# Patient Record
Sex: FEMALE | Race: WHITE | Employment: FULL TIME | URBAN - METROPOLITAN AREA
[De-identification: names, ages, dates, MRNs, and addresses within clinical notes are randomized per-mention and may not be internally consistent; named-entity substitution may affect disease eponyms.]

---

## 2018-05-04 PROBLEM — J30.2 SEASONAL ALLERGIC RHINITIS: Status: ACTIVE | Noted: 2018-05-04

## 2018-05-10 ENCOUNTER — HOSPITAL ENCOUNTER (OUTPATIENT)
Dept: MAMMOGRAPHY | Age: 33
Discharge: HOME | End: 2018-05-10
Attending: FAMILY MEDICINE
Payer: COMMERCIAL

## 2018-05-10 DIAGNOSIS — N63.0 BREAST MASS: ICD-10-CM

## 2018-05-10 PROCEDURE — 76642 ULTRASOUND BREAST LIMITED: CPT

## 2018-05-10 PROCEDURE — 77066 DX MAMMO INCL CAD BI: CPT

## 2018-11-20 ENCOUNTER — HOSPITAL ENCOUNTER (OUTPATIENT)
Dept: PHYSICAL THERAPY | Age: 33
Discharge: HOME | End: 2018-11-20
Attending: FAMILY MEDICINE
Payer: COMMERCIAL

## 2018-11-20 DIAGNOSIS — M25.521 RIGHT ELBOW PAIN: ICD-10-CM

## 2018-11-20 PROCEDURE — 97140 MANUAL THERAPY 1/> REGIONS: CPT

## 2018-11-20 PROCEDURE — 97161 PT EVAL LOW COMPLEX 20 MIN: CPT

## 2018-11-20 NOTE — THERAPY EVALUATION
Raquel Bhardwaj  : 1985  Primary: [address]  Secondary:  [address] [place]  at [facility]  [address], [address], [place] [address] [address] [facility]  [phone]   [phone]       OUTPATIENT PHYSICAL THERAPY:Initial Assessment 2018    ICD-10: Treatment Diagnosis: Pain in right elbow (M25.521)    Pain in right arm (M79.601)  Precautions/Allergies:   Patient has no allergy information on record. MD Orders: Evaluate and treat MEDICAL/REFERRING DIAGNOSIS:  Right elbow pain [M25.521]   DATE OF ONSET: beginning of 2018  REFERRING PHYSICIAN: Simon Hargrove DO  RETURN PHYSICIAN APPOINTMENT: unspecified   This section established at most recent assessment  ASSESSMENT:  Raquel Bhardwaj is a 29 y.o. female who presents to physical therapy for sudden insidious onset of R elbow and anterior arm pain beginning several weeks prior to initial evaluation. This session, she demonstrated decreased B UE strength/endurance (R UE weaker), decreased R elbow mobility with associated pain, and decreased functional mobility as evident by a score of 26/55 on the Disabilities of the Arm, Shoulder, and Hand Questionnaire (with higher scores indicating increased disability). 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 Of note, pt's pain appears to be originating from significant tightness in her R biceps and wrist flexors (may be due to combination of stress and prolonged positioning of R elbow in flexed position). Pt may benefit from skilled PT to address the above listed deficits to improve ability to perform pain-free ADLs/IADLs and to improve overall quality of life prior to discharge. PROBLEM LIST (Impacting functional limitations):  1. Decreased Strength  2. Decreased ADL/Functional Activities  3. Increased Pain  4. Decreased Activity Tolerance  5.  Decreased Work Simplification/Energy Conservation Techniques  6. Decreased Flexibility/Joint Mobility  7. Edema/Girth INTERVENTIONS PLANNED:  1. Bed Mobility  2. Cold  3. Electrical Stimulation  4. Heat  5. Home Exercise Program (HEP)  6. Manual Therapy  7. Neuromuscular Re-education/Strengthening  8. Range of Motion (ROM)  9. Therapeutic Activites  10. Therapeutic Exercise/Strengthening  11. Transcutaneous Electrical Nerve Stimulation (TENS)  12. Ultrasound   TREATMENT PLAN:  Effective Dates/Frequency/Duration: Once per week from 11/20/2018 till 1/4/2019 (45 Days). .  GOALS: (Goals have been discussed and agreed upon with patient.)  Discharge Goals: Time Frame: 45 days  1. Pt will be compliant and independent with HEP in order to increase UE strength/endurance/mobility to improve functional mobility and overall quality of life. 2. Pt will improve score on the Disabilities of the Arm, Shoulder, and Hand (DASH) Questionnaire to 11/55 in order to improve independence with ADLs and IADLs and to improve overall quality of life. 3. Pt will increase R elbow extension PROM to 0 degrees with minimal to no increase in pain in order to improve functional mobility and ability to reach further distance. 4. Pt will be able to open a heavy door with minimal to no increase in pain in order to be able to demonstrate improved mobility. Rehabilitation Potential For Stated Goals: Good  Regarding Jordi garvin, I certify that the treatment plan above will be carried out by a therapist or under their direction. Thank you for this referral,  Daniel Kramer DPT     Referring Physician Signature: Jacquelin Blanco DO              Date                    HISTORY:   History of Present Injury/Illness (Reason for Referral): *History per pt or pt's family except where otherwise noted  Pt states a couple of weeks ago in the evening her R arm (along anterior arm) started to become very painful and she couldn't straighten it all the way.  States the worst amount of pain was in the evening and at night when trying to sleep (couldn't get it in a comfortable position), but now the most pain is when she is doing aggravating activities (states she feels sharp pain that radiates along anterior arm). Pain at worst is 9/10 (aggravating activities include trying to reach behind her back to wipe after toileting, reaching for something far away by her side, supinating her arm, working at the computer with moving mouse/typing, and opening a heavy door). Pain goes away almost immediately after aggravating activity. Pt states the pain has occasionally woken up during the middle of the night due to pain, but this has only happened a couple of times. Pain at best is 0/10 (eases pain with putting arm in resting cradled position, ibuprofen). Past Medical History/Comorbidities:   Ms. Daria Josue  has no past medical history on file. Ms. Daria Josue  has a past surgical history that includes hx other surgical.  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 Fall Risk:  Ambulatory/Rehab Services H2 Model Falls Risk Assessment   Risk Factors:       No Risk Factors Identified Ability to Rise from Chair:       (0)  Ability to rise in a single movement   Falls Prevention Plan:       No modifications necessary   Total: (5 or greater = High Risk): 0   ©2010 [facility] of [employer]. All Rights Reserved. [facility] Patent #6,[id number].  Federal Law prohibits the replication, distribution or use without written permission from [employer]     Current Medications:    Current Outpatient Medications:     norelgestromin-ethinyl estradiol (ORTHO EVRA) 150-35 mcg/24 hr, 1 Patch by TransDERmal route Every Saturday. , Disp: 3 Patch, Rfl: 13    loratadine (CLARITIN) 10 mg tablet, Take 10 mg by mouth., Disp: , Rfl:    Date Last Reviewed:  11/20/2018   # of Personal Factors/Comorbidities that affect the Plan of Care: 0: LOW COMPLEXITY   EXAMINATION:   Initial assessment on 11/20/2018  Observation/Orthostatic Postural Assessment:    The following postural deficits were noted in sitting: forward head, rounded shoulders and posterior pelvic tilt   The following postural deficits were noted in standing: none of the above   Palpation:         Palpable tightness along R biceps brachii (short head and long head), brachialis, brachioradialis, pronator teres, wrist flexors (flexor carpi radialis, palmaris longus, flexor carpi ulnaris, flexor digitorum)   ROM:    Initial measurement: (on 11/20/2018) Initial measurement: (on 11/20/2018) Reassessment measurement:  Reassessment measurement:    L UE:  Elbow extension: slightly past 0 degrees (pt hyperextends slightly)  Elbow flexion: WFL R UE:  Elbow extension: 17 degrees flexion passively with pain  Elbow flexion: 147 degrees passively with pain in biceps       Initial measurement: (on 11/20/2018) Reassessment measurement: Reassessment measurement:    Cervical AROM 100%, slightly tight on R side per pt report    Thoracic AROM WFL            Strength:    Initial measurement: (on 11/20/2018) Initial measurement: (on 11/20/2018) Reassessment measurement:  Reassessment measurement:    L UE:  Shoulder flexion: 4/5  Shoulder extension: 5/5  Shoulder abduction: 4/5  Shoulder adduction: 5/5  Shoulder IR: 5/5  Shoulder ER: 4+/5  Elbow flexion: 5/5  Elbow extension: 5/5  Wrist flexion/extension: 5/5 R UE:  Shoulder flexion: 4-/5 (pain in bicep musculature)  Shoulder extension: 5/5  Shoulder abduction: 4/5 (sore in anterior elbow and radiating out)  Shoulder adduction: 5/5  Shoulder IR: 5/5  Shoulder ER: 4+/5  Elbow flexion: 4-/5 (pain in wrist flexors and bicep)  Elbow extension: 4/5  Wrist flexion/extension: 5/5       Special Tests:          Wrist extensor stretch: + for pain  Neurological Screen:        Myotomes:  WFL        Dermatomes:  No deficits noted     Body Structures Involved:  1. Nerves  2. Bones  3. Joints  4. Muscles  5. Ligaments Body Functions Affected:  1. Sensory/Pain  2. Neuromusculoskeletal  3. Movement Related Activities and Participation Affected:  1. General Tasks and Demands  2. Mobility  3. Self Care  4. Domestic Life  5. Interpersonal Interactions and Relationships  6. Community, Social and [place]   # of elements that affect the Plan of Care: 4+: HIGH COMPLEXITY   CLINICAL PRESENTATION:   Presentation: Evolving clinical presentation with changing clinical characteristics: MODERATE COMPLEXITY   CLINICAL DECISION MAKING:   Outcome Measure: Tool Used: Disabilities of the Arm, Shoulder and Hand (DASH) Questionnaire - Quick Version  Score:  Initial: 26/55  Most Recent: X/55 (Date: -- )   Interpretation of Score: The DASH is designed to measure the activities of daily living in person's with upper extremity dysfunction or pain. Each section is scored on a 1-5 scale, 5 representing the greatest disability. The scores of each section are added together for a total score of 55. Score 11 12-19 20-28 29-37 38-45 46-54 55   Modifier CH CI CJ CK CL CM CN     Payor: Minnie Terrell / Plan: Aggie Hameed / Product Type: PPO /   Medical Necessity:   · Patient is expected to demonstrate progress in strength, range of motion and functional technique to improve ability to perform pain-free ADLs/IADLs adn to improve overall quality of life. · Patient demonstrates good rehab potential due to higher previous functional level.   Reason for Services/Other Comments:  · Patient continues to require modification of therapeutic interventions to increase complexity of exercises. Use of outcome tool(s) and clinical judgement create a POC that gives a: Questionable prediction of patient's progress: MODERATE COMPLEXITY   TREATMENT:   (In addition to Assessment/Re-Assessment sessions the following treatments were rendered)  Subjective comments at beginning of session: See history above  MANUAL THERAPY: (24 minutes): Joint mobilization and Soft tissue mobilization was utilized and necessary because of the patient's restricted joint motion, painful spasm, loss of articular motion and restricted motion of soft tissue. With pt in supine position, STM and trigger point release along R biceps brachii (short head and long head), brachialis, brachioradialis, pronator teres, wrist flexors (flexor carpi radialis, palmaris longus, flexor carpi ulnaris, flexor digitorum) to reduce muscular tightness and pain and improve elbow extension. Treatment/Session Assessment:  See assessment above. Discussed and demonstrated to pt how to perform gradual stretching of arm with support on progressively less pillows, doorway stretch, wrist flexor stretch. · Pain/ Symptoms: Initial:   No pain reported at beginning of session with pt in rest position Post Session:  No pain at end of session ·   Compliance with Program/Exercises: Will assess as treatment progresses. · Recommendations/Intent for next treatment session: \"Next visit will focus on advancements to more challenging activities and reduction in assistance provided\".  Manual therapy/modalities as needed to reduce pain; gentle strengthening and stretching exercises if needed; work on ergonomics for pt's work  Total Treatment Duration:  PT Patient Time In/Time Out  Time In: 1300  Time Out: DIMITRI Cotter    Future Appointments   Date Time Provider Moises Carolann   11/27/2018  8:00 AM Jethro Heaton DPT [facility]   11/14/2019  9:15 AM Gal Yee MD [address]

## 2018-11-27 ENCOUNTER — HOSPITAL ENCOUNTER (OUTPATIENT)
Dept: PHYSICAL THERAPY | Age: 33
Discharge: HOME | End: 2018-11-27
Payer: COMMERCIAL

## 2018-11-27 PROCEDURE — 97140 MANUAL THERAPY 1/> REGIONS: CPT

## 2018-11-27 NOTE — PROGRESS NOTES
Ophelia Pardo  : 1985  Primary: [address]  Secondary:  [address]  at [facility] [facility] [place], [facility] [address] [facility]  [phone]   [phone]       OUTPATIENT PHYSICAL THERAPY:Daily Note 2018    ICD-10: Treatment Diagnosis: Pain in right elbow (M25.521)    Pain in right arm (M79.601)  Precautions/Allergies:   Patient has no allergy information on record. MD Orders: Evaluate and treat MEDICAL/REFERRING DIAGNOSIS:  Right elbow pain [M25.521]   DATE OF ONSET: beginning of 2018  REFERRING PHYSICIAN: DO ORION Estrada PHYSICIAN APPOINTMENT: unspecified   This section established at most recent assessment  ASSESSMENT:  Ophelia Pardo is a 29 y.o. female who presents to physical therapy for sudden insidious onset of R elbow and anterior arm pain beginning several weeks prior to initial evaluation. This session, she demonstrated decreased B UE strength/endurance (R UE weaker), decreased R elbow mobility with associated pain, and decreased functional mobility as evident by a score of 26/55 on the Disabilities of the Arm, Shoulder, and Hand Questionnaire (with higher scores indicating increased disability). 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 Of note, pt's pain appears to be originating from significant tightness in her R biceps and wrist flexors (may be due to combination of stress and prolonged positioning of R elbow in flexed position). Pt may benefit from skilled PT to address the above listed deficits to improve ability to perform pain-free ADLs/IADLs and to improve overall quality of life prior to discharge. PROBLEM LIST (Impacting functional limitations):  1. Decreased Strength  2. Decreased ADL/Functional Activities  3. Increased Pain  4. Decreased Activity Tolerance  5.  Decreased Work Simplification/Energy Conservation Techniques  6. Decreased Flexibility/Joint Mobility  7. Edema/Girth INTERVENTIONS PLANNED:  1. Bed Mobility  2. Cold  3. Electrical Stimulation  4. Heat  5. Home Exercise Program (HEP)  6. Manual Therapy  7. Neuromuscular Re-education/Strengthening  8. Range of Motion (ROM)  9. Therapeutic Activites  10. Therapeutic Exercise/Strengthening  11. Transcutaneous Electrical Nerve Stimulation (TENS)  12. Ultrasound   TREATMENT PLAN:  Effective Dates/Frequency/Duration: Once per week from 11/20/2018 till 1/4/2019 (45 Days). .  GOALS: (Goals have been discussed and agreed upon with patient.)  Discharge Goals: Time Frame: 45 days  1. Pt will be compliant and independent with HEP in order to increase UE strength/endurance/mobility to improve functional mobility and overall quality of life. 2. Pt will improve score on the Disabilities of the Arm, Shoulder, and Hand (DASH) Questionnaire to 11/55 in order to improve independence with ADLs and IADLs and to improve overall quality of life. 3. Pt will increase R elbow extension PROM to 0 degrees with minimal to no increase in pain in order to improve functional mobility and ability to reach further distance. 4. Pt will be able to open a heavy door with minimal to no increase in pain in order to be able to demonstrate improved mobility. Rehabilitation Potential For Stated Goals: Good  Regarding Steph Salon therapy, I certify that the treatment plan above will be carried out by a therapist or under their direction. Thank you for this referral,  Angel Trejo DPT     Referring Physician Signature: Debra Morrell DO              Date                    HISTORY:   History of Present Injury/Illness (Reason for Referral): *History per pt or pt's family except where otherwise noted  Pt states a couple of weeks ago in the evening her R arm (along anterior arm) started to become very painful and she couldn't straighten it all the way.  States the worst amount of pain was in the evening and at night when trying to sleep (couldn't get it in a comfortable position), but now the most pain is when she is doing aggravating activities (states she feels sharp pain that radiates along anterior arm). Pain at worst is 9/10 (aggravating activities include trying to reach behind her back to wipe after toileting, reaching for something far away by her side, supinating her arm, working at the computer with moving mouse/typing, and opening a heavy door). Pain goes away almost immediately after aggravating activity. Pt states the pain has occasionally woken up during the middle of the night due to pain, but this has only happened a couple of times. Pain at best is 0/10 (eases pain with putting arm in resting cradled position, ibuprofen). Past Medical History/Comorbidities:   Ms. Valeria Quinones  has no past medical history on file. Ms. Valeria Quinones  has a past surgical history that includes hx other surgical.  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 Fall Risk:  Ambulatory/Rehab Services H2 Model Falls Risk Assessment   Risk Factors:       No Risk Factors Identified Ability to Rise from Chair:       (0)  Ability to rise in a single movement   Falls Prevention Plan:       No modifications necessary   Total: (5 or greater = High Risk): 0   ©2010 [facility] [employer]. All Rights Reserved. [facility] Patent #[id number].  Federal Law prohibits the replication, distribution or use without written permission from [facility] [employer]     Current Medications:    Current Outpatient Medications:     norelgestromin-ethinyl estradiol (ORTHO EVRA) 150-35 mcg/24 hr, 1 Patch by TransDERmal route Every Saturday. , Disp: 3 Patch, Rfl: 13    loratadine (CLARITIN) 10 mg tablet, Take 10 mg by mouth., Disp: , Rfl:    Date Last Reviewed:  11/27/2018   # of Personal Factors/Comorbidities that affect the Plan of Care: 0: LOW COMPLEXITY   EXAMINATION:   Initial assessment on 11/20/2018  Observation/Orthostatic Postural Assessment:    The following postural deficits were noted in sitting: forward head, rounded shoulders and posterior pelvic tilt   The following postural deficits were noted in standing: none of the above   Palpation:         Palpable tightness along R biceps brachii (short head and long head), brachialis, brachioradialis, pronator teres, wrist flexors (flexor carpi radialis, palmaris longus, flexor carpi ulnaris, flexor digitorum)   ROM:    Initial measurement: (on 11/20/2018) Initial measurement: (on 11/20/2018) Reassessment measurement:  Reassessment measurement:    L UE:  Elbow extension: slightly past 0 degrees (pt hyperextends slightly)  Elbow flexion: WFL R UE:  Elbow extension: 17 degrees flexion passively with pain  Elbow flexion: 147 degrees passively with pain in biceps       Initial measurement: (on 11/20/2018) Reassessment measurement: Reassessment measurement:    Cervical AROM 100%, slightly tight on R side per pt report    Thoracic AROM WFL            Strength:    Initial measurement: (on 11/20/2018) Initial measurement: (on 11/20/2018) Reassessment measurement:  Reassessment measurement:    L UE:  Shoulder flexion: 4/5  Shoulder extension: 5/5  Shoulder abduction: 4/5  Shoulder adduction: 5/5  Shoulder IR: 5/5  Shoulder ER: 4+/5  Elbow flexion: 5/5  Elbow extension: 5/5  Wrist flexion/extension: 5/5 R UE:  Shoulder flexion: 4-/5 (pain in bicep musculature)  Shoulder extension: 5/5  Shoulder abduction: 4/5 (sore in anterior elbow and radiating out)  Shoulder adduction: 5/5  Shoulder IR: 5/5  Shoulder ER: 4+/5  Elbow flexion: 4-/5 (pain in wrist flexors and bicep)  Elbow extension: 4/5  Wrist flexion/extension: 5/5       Special Tests:          Wrist extensor stretch: + for pain  Neurological Screen:        Myotomes:  WFL        Dermatomes:  No deficits noted     Body Structures Involved:  1. Nerves  2. Bones  3. Joints  4. Muscles  5. Ligaments Body Functions Affected:  1. Sensory/Pain  2. Neuromusculoskeletal  3. Movement Related Activities and Participation Affected:  1. General Tasks and Demands  2. Mobility  3. Self Care  4. Domestic Life  5. Interpersonal Interactions and Relationships  6. Community, Social and [place]   # of elements that affect the Plan of Care: 4+: HIGH COMPLEXITY   CLINICAL PRESENTATION:   Presentation: Evolving clinical presentation with changing clinical characteristics: MODERATE COMPLEXITY   CLINICAL DECISION MAKING:   Outcome Measure: Tool Used: Disabilities of the Arm, Shoulder and Hand (DASH) Questionnaire - Quick Version  Score:  Initial: 26/55  Most Recent: X/55 (Date: -- )   Interpretation of Score: The DASH is designed to measure the activities of daily living in person's with upper extremity dysfunction or pain. Each section is scored on a 1-5 scale, 5 representing the greatest disability. The scores of each section are added together for a total score of 55. Score 11 12-19 20-28 29-37 38-45 46-54 55   Modifier CH CI CJ CK CL CM CN     Payor: James Click / Plan: Christa Free / Product Type: PPO /   Medical Necessity:   · Patient is expected to demonstrate progress in strength, range of motion and functional technique to improve ability to perform pain-free ADLs/IADLs adn to improve overall quality of life. · Patient demonstrates good rehab potential due to higher previous functional level.   Reason for Services/Other Comments:  · Patient continues to require modification of therapeutic interventions to increase complexity of exercises. Use of outcome tool(s) and clinical judgement create a POC that gives a: Questionable prediction of patient's progress: MODERATE COMPLEXITY   TREATMENT:   (In addition to Assessment/Re-Assessment sessions the following treatments were rendered)  Subjective comments at beginning of session: Pt reporting that her elbow is able to straighten a lot more easily today and this past week (she has been stretching every day), and she is able to do a lot more without her arm hurting, although she still has some short bouts of sharp pain occasionally when moving her arm faster  MANUAL THERAPY: (40 minutes): Joint mobilization and Soft tissue mobilization was utilized and necessary because of the patient's restricted joint motion, painful spasm, loss of articular motion and restricted motion of soft tissue. With pt in supine position, STM and trigger point release along R biceps brachii (short head and long head), brachialis, brachioradialis, pronator teres, wrist flexors (flexor carpi radialis, palmaris longus, flexor carpi ulnaris, flexor digitorum) to reduce muscular tightness and pain and improve elbow extension. Treatment/Session Assessment:  Pt demonstrating significantly improved R elbow extension at beginning of session, and this improved further with manual therapy, but she continues to have slight limitation at end range. · Pain/ Symptoms: Initial:   No pain reported at beginning of session  Post Session:  No pain at end of session ·   Compliance with Program/Exercises: Will assess as treatment progresses. · Recommendations/Intent for next treatment session: \"Next visit will focus on advancements to more challenging activities and reduction in assistance provided\".  Manual therapy/modalities as needed to reduce pain; gentle strengthening and stretching exercises if needed; work on ergonomics for pt's work  Total Treatment Duration:  PT Patient Time In/Time Out  Time In: 0800  Time Out: 14[address] [place] Loni Robledo DPT    Future Appointments   Date Time Provider Moises Vuong   11/14/2019  9:15 AM Viral Yee MD [facility] [address] [place] [address]

## 2018-12-06 ENCOUNTER — HOSPITAL ENCOUNTER (OUTPATIENT)
Dept: PHYSICAL THERAPY | Age: 33
Discharge: HOME | End: 2018-12-06
Payer: COMMERCIAL

## 2018-12-06 PROCEDURE — 97014 ELECTRIC STIMULATION THERAPY: CPT

## 2018-12-06 PROCEDURE — 97140 MANUAL THERAPY 1/> REGIONS: CPT

## 2018-12-06 NOTE — PROGRESS NOTES
Norman Crespo  : 1985  Primary: [address]  Secondary:  [address] [place]  at [facility]  [facility], [address] [place] [address], [place], [facility]  [phone]   [phone]       OUTPATIENT PHYSICAL THERAPY:Daily Note 2018    ICD-10: Treatment Diagnosis: Pain in right elbow (M25.521)    Pain in right arm (M79.601)  Precautions/Allergies:   Patient has no allergy information on record. MD Orders: Evaluate and treat MEDICAL/REFERRING DIAGNOSIS:  Right elbow pain [M25.521]   DATE OF ONSET: beginning of 2018  REFERRING PHYSICIAN: Cheryle Jain, DO RETURN PHYSICIAN APPOINTMENT: unspecified   This section established at most recent assessment  ASSESSMENT:  Norman Crespo is a 29 y.o. female who presents to physical therapy for sudden insidious onset of R elbow and anterior arm pain beginning several weeks prior to initial evaluation. This session, she demonstrated decreased B UE strength/endurance (R UE weaker), decreased R elbow mobility with associated pain, and decreased functional mobility as evident by a score of 26/55 on the Disabilities of the Arm, Shoulder, and Hand Questionnaire (with higher scores indicating increased disability). 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 Of note, pt's pain appears to be originating from significant tightness in her R biceps and wrist flexors (may be due to combination of stress and prolonged positioning of R elbow in flexed position). Pt may benefit from skilled PT to address the above listed deficits to improve ability to perform pain-free ADLs/IADLs and to improve overall quality of life prior to discharge. PROBLEM LIST (Impacting functional limitations):  1. Decreased Strength  2. Decreased ADL/Functional Activities  3. Increased Pain  4. Decreased Activity Tolerance  5.  Decreased Work Simplification/Energy Conservation Techniques  6. Decreased Flexibility/Joint Mobility  7. Edema/Girth INTERVENTIONS PLANNED:  1. Bed Mobility  2. Cold  3. Electrical Stimulation  4. Heat  5. Home Exercise Program (HEP)  6. Manual Therapy  7. Neuromuscular Re-education/Strengthening  8. Range of Motion (ROM)  9. Therapeutic Activites  10. Therapeutic Exercise/Strengthening  11. Transcutaneous Electrical Nerve Stimulation (TENS)  12. Ultrasound   TREATMENT PLAN:  Effective Dates/Frequency/Duration: Once per week from 11/20/2018 till 1/4/2019 (45 Days). .  GOALS: (Goals have been discussed and agreed upon with patient.)  Discharge Goals: Time Frame: 45 days  1. Pt will be compliant and independent with HEP in order to increase UE strength/endurance/mobility to improve functional mobility and overall quality of life. 2. Pt will improve score on the Disabilities of the Arm, Shoulder, and Hand (DASH) Questionnaire to 11/55 in order to improve independence with ADLs and IADLs and to improve overall quality of life. 3. Pt will increase R elbow extension PROM to 0 degrees with minimal to no increase in pain in order to improve functional mobility and ability to reach further distance. 4. Pt will be able to open a heavy door with minimal to no increase in pain in order to be able to demonstrate improved mobility. Rehabilitation Potential For Stated Goals: Good  Regarding [place] [facility] therapy, I certify that the treatment plan above will be carried out by a therapist or under their direction. Thank you for this referral,  Lb Ayala PTA     Referring Physician Signature: Cheryle Jain, DO              Date                    HISTORY:   History of Present Injury/Illness (Reason for Referral): *History per pt or pt's family except where otherwise noted  Pt states a couple of weeks ago in the evening her R arm (along anterior arm) started to become very painful and she couldn't straighten it all the way.  States the worst amount of pain was in the evening and at night when trying to sleep (couldn't get it in a comfortable position), but now the most pain is when she is doing aggravating activities (states she feels sharp pain that radiates along anterior arm). Pain at worst is 9/10 (aggravating activities include trying to reach behind her back to wipe after toileting, reaching for something far away by her side, supinating her arm, working at the computer with moving mouse/typing, and opening a heavy door). Pain goes away almost immediately after aggravating activity. Pt states the pain has occasionally woken up during the middle of the night due to pain, but this has only happened a couple of times. Pain at best is 0/10 (eases pain with putting arm in resting cradled position, ibuprofen). Past Medical History/Comorbidities:   Ms. Angi Walker  has no past medical history on file. Ms. Angi Walker  has a past surgical history that includes hx other surgical.  Social History/Living Environment:    lives alone in apartment on ground floor (no steps to get up)  Prior Level of Function/Work/Activity:  Works as pharmacist (sits mostly at computer for about 3 hours at a time, working 8 hours a day, 5-7 days a week - has to work at [employer]); pt golfs  Dominant Side:         RIGHT  Other Clinical Tests:          none  Previous Treatment Approaches:          none  Personal Factors:          Sex:  female         Age:  29 y.o. Fall Risk:  Ambulatory/Rehab Services H2 Model Falls Risk Assessment   Risk Factors:       No Risk Factors Identified Ability to Rise from Chair:       (0)  Ability to rise in a single movement   Falls Prevention Plan:       No modifications necessary   Total: (5 or greater = High Risk): 0   ©2010 [facility] [employer]. All Rights Reserved. [facility] Patent #[id number].  Federal Law prohibits the replication, distribution or use without written permission from [facility] [employer]     Current Medications:    Current Outpatient Medications:     norelgestromin-ethinyl estradiol (ORTHO EVRA) 150-35 mcg/24 hr, 1 Patch by TransDERmal route Every Saturday. , Disp: 3 Patch, Rfl: 13    loratadine (CLARITIN) 10 mg tablet, Take 10 mg by mouth., Disp: , Rfl:    Date Last Reviewed:  12/6/2018   # of Personal Factors/Comorbidities that affect the Plan of Care: 0: LOW COMPLEXITY   EXAMINATION:   Initial assessment on 11/20/2018  Observation/Orthostatic Postural Assessment:    The following postural deficits were noted in sitting: forward head, rounded shoulders and posterior pelvic tilt   The following postural deficits were noted in standing: none of the above   Palpation:         Palpable tightness along R biceps brachii (short head and long head), brachialis, brachioradialis, pronator teres, wrist flexors (flexor carpi radialis, palmaris longus, flexor carpi ulnaris, flexor digitorum)   ROM:    Initial measurement: (on 11/20/2018) Initial measurement: (on 11/20/2018) Reassessment measurement:  Reassessment measurement:    L UE:  Elbow extension: slightly past 0 degrees (pt hyperextends slightly)  Elbow flexion: WFL R UE:  Elbow extension: 17 degrees flexion passively with pain  Elbow flexion: 147 degrees passively with pain in biceps       Initial measurement: (on 11/20/2018) Reassessment measurement: Reassessment measurement:    Cervical AROM 100%, slightly tight on R side per pt report    Thoracic AROM WFL            Strength:    Initial measurement: (on 11/20/2018) Initial measurement: (on 11/20/2018) Reassessment measurement:  Reassessment measurement:    L UE:  Shoulder flexion: 4/5  Shoulder extension: 5/5  Shoulder abduction: 4/5  Shoulder adduction: 5/5  Shoulder IR: 5/5  Shoulder ER: 4+/5  Elbow flexion: 5/5  Elbow extension: 5/5  Wrist flexion/extension: 5/5 R UE:  Shoulder flexion: 4-/5 (pain in bicep musculature)  Shoulder extension: 5/5  Shoulder abduction: 4/5 (sore in anterior elbow and radiating out)  Shoulder adduction: 5/5  Shoulder IR: 5/5  Shoulder ER: 4+/5  Elbow flexion: 4-/5 (pain in wrist flexors and bicep)  Elbow extension: 4/5  Wrist flexion/extension: 5/5       Special Tests:          Wrist extensor stretch: + for pain  Neurological Screen:        Myotomes:  WFL        Dermatomes:  No deficits noted     Body Structures Involved:  1. Nerves  2. Bones  3. Joints  4. Muscles  5. Ligaments Body Functions Affected:  1. Sensory/Pain  2. Neuromusculoskeletal  3. Movement Related Activities and Participation Affected:  1. General Tasks and Demands  2. Mobility  3. Self Care  4. Domestic Life  5. Interpersonal Interactions and Relationships  6. Community, Social and [place]   # of elements that affect the Plan of Care: 4+: HIGH COMPLEXITY   CLINICAL PRESENTATION:   Presentation: Evolving clinical presentation with changing clinical characteristics: MODERATE COMPLEXITY   CLINICAL DECISION MAKING:   Outcome Measure: Tool Used: Disabilities of the Arm, Shoulder and Hand (DASH) Questionnaire - Quick Version  Score:  Initial: 26/55  Most Recent: X/55 (Date: -- )   Interpretation of Score: The DASH is designed to measure the activities of daily living in person's with upper extremity dysfunction or pain. Each section is scored on a 1-5 scale, 5 representing the greatest disability. The scores of each section are added together for a total score of 55. Score 11 12-19 20-28 29-37 38-45 46-54 55   Modifier CH CI CJ CK CL CM CN     Payor: Haylee Saldana / Plan: Jun Owens / Product Type: PPO /   Medical Necessity:   · Patient is expected to demonstrate progress in strength, range of motion and functional technique to improve ability to perform pain-free ADLs/IADLs adn to improve overall quality of life. · Patient demonstrates good rehab potential due to higher previous functional level.   Reason for Services/Other Comments:  · Patient continues to require modification of therapeutic interventions to increase complexity of exercises. Use of outcome tool(s) and clinical judgement create a POC that gives a: Questionable prediction of patient's progress: MODERATE COMPLEXITY   TREATMENT:   (In addition to Assessment/Re-Assessment sessions the following treatments were rendered)  Subjective comments at beginning of session: Pt reports pain level is 0/10 today but continues to feel mild pain and tightness off and on with some stretching movements during the day. Patient states that she is able to stretch her elbow out now. No difficulties at work. MANUAL THERAPY: (30 minutes): Joint mobilization and Soft tissue mobilization was utilized and necessary because of the patient's restricted joint motion, painful spasm, loss of articular motion and restricted motion of soft tissue. Patient sitting with right arm supported for STM to right upper arm from shoulder to elbow area and then STM to right neck, upper trap and along scapular border where tightness was noted in all areas. Trigger point release to right upper trap and levator areas. Some palpable tenderness in this area. MODALITIES: (15 MINUTES) Patient supine with knee wedge in place for moist heat to right upper trap and along scapular border along with IFES at 18 ma to help decrease tightness and symptoms. Treatment/Session Assessment:  Pt with good elbow extension and decreased tightness in right upper arm and right upper trap and scapular border. Patient reported no pain following treatment. Continue per plan of care and add posture exercises next visit. · Pain/ Symptoms: Initial:   No pain reported at beginning of session  Post Session:  No pain at end of session ·   Compliance with Program/Exercises: Will assess as treatment progresses. · Recommendations/Intent for next treatment session: \"Next visit will focus on advancements to more challenging activities and reduction in assistance provided\".  Manual therapy/modalities as needed to reduce pain; gentle strengthening and stretching exercises if needed; work on ergonomics for pt's work  Total Treatment Duration:  PT Patient Time In/Time Out  Time In: 1300  Time Out: Via [address]    Future Appointments   Date Time Provider Moises Vuong   12/14/2018  9:15 AM Susanne Weber PTA [facility]   11/14/2019  9:15 AM Joan Yee MD [address]

## 2018-12-14 ENCOUNTER — HOSPITAL ENCOUNTER (OUTPATIENT)
Dept: PHYSICAL THERAPY | Age: 33
Discharge: HOME | End: 2018-12-14
Payer: COMMERCIAL

## 2018-12-14 PROCEDURE — 97014 ELECTRIC STIMULATION THERAPY: CPT

## 2018-12-14 PROCEDURE — 97140 MANUAL THERAPY 1/> REGIONS: CPT

## 2018-12-14 PROCEDURE — 97110 THERAPEUTIC EXERCISES: CPT

## 2018-12-14 NOTE — PROGRESS NOTES
Gwynn Bence  : 1985  Primary: [address]*  Secondary:  [address]  [facility] [address], [place] [address] [address] [facility]  [phone]   [phone]       OUTPATIENT PHYSICAL THERAPY:Daily Note 2018    ICD-10: Treatment Diagnosis: Pain in right elbow (M25.521)    Pain in right arm (M79.601)  Precautions/Allergies:   Patient has no allergy information on record. MD Orders: Evaluate and treat MEDICAL/REFERRING DIAGNOSIS:  Right elbow pain [M25.521]   DATE OF ONSET: beginning of 2018  REFERRING PHYSICIAN: DO ORION Anderson PHYSICIAN APPOINTMENT: unspecified   This section established at most recent assessment  ASSESSMENT:  Gwynn Bence is a 29 y.o. female who presents to physical therapy for sudden insidious onset of R elbow and anterior arm pain beginning several weeks prior to initial evaluation. This session, she demonstrated decreased B UE strength/endurance (R UE weaker), decreased R elbow mobility with associated pain, and decreased functional mobility as evident by a score of 26/55 on the Disabilities of the Arm, Shoulder, and Hand Questionnaire (with higher scores indicating increased disability). 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 Of note, pt's pain appears to be originating from significant tightness in her R biceps and wrist flexors (may be due to combination of stress and prolonged positioning of R elbow in flexed position). Pt may benefit from skilled PT to address the above listed deficits to improve ability to perform pain-free ADLs/IADLs and to improve overall quality of life prior to discharge. PROBLEM LIST (Impacting functional limitations):  1. Decreased Strength  2. Decreased ADL/Functional Activities  3. Increased Pain  4. Decreased Activity Tolerance  5.  Decreased Work Simplification/Energy Conservation Techniques  6. Decreased Flexibility/Joint Mobility  7. Edema/Girth INTERVENTIONS PLANNED:  1. Bed Mobility  2. Cold  3. Electrical Stimulation  4. Heat  5. Home Exercise Program (HEP)  6. Manual Therapy  7. Neuromuscular Re-education/Strengthening  8. Range of Motion (ROM)  9. Therapeutic Activites  10. Therapeutic Exercise/Strengthening  11. Transcutaneous Electrical Nerve Stimulation (TENS)  12. Ultrasound   TREATMENT PLAN:  Effective Dates/Frequency/Duration: Once per week from 11/20/2018 till 1/4/2019 (45 Days). .  GOALS: (Goals have been discussed and agreed upon with patient.)  Discharge Goals: Time Frame: 45 days  1. Pt will be compliant and independent with HEP in order to increase UE strength/endurance/mobility to improve functional mobility and overall quality of life. 2. Pt will improve score on the Disabilities of the Arm, Shoulder, and Hand (DASH) Questionnaire to 11/55 in order to improve independence with ADLs and IADLs and to improve overall quality of life. 3. Pt will increase R elbow extension PROM to 0 degrees with minimal to no increase in pain in order to improve functional mobility and ability to reach further distance. 4. Pt will be able to open a heavy door with minimal to no increase in pain in order to be able to demonstrate improved mobility. Rehabilitation Potential For Stated Goals: Good  Regarding Jhon Heredia therapy, I certify that the treatment plan above will be carried out by a therapist or under their direction. Thank you for this referral,  Saul Blake PTA     Referring Physician Signature: Ras Saldaña DO              Date                    HISTORY:   History of Present Injury/Illness (Reason for Referral): *History per pt or pt's family except where otherwise noted  Pt states a couple of weeks ago in the evening her R arm (along anterior arm) started to become very painful and she couldn't straighten it all the way.  States the worst amount of pain was in the evening and at night when trying to sleep (couldn't get it in a comfortable position), but now the most pain is when she is doing aggravating activities (states she feels sharp pain that radiates along anterior arm). Pain at worst is 9/10 (aggravating activities include trying to reach behind her back to wipe after toileting, reaching for something far away by her side, supinating her arm, working at the computer with moving mouse/typing, and opening a heavy door). Pain goes away almost immediately after aggravating activity. Pt states the pain has occasionally woken up during the middle of the night due to pain, but this has only happened a couple of times. Pain at best is 0/10 (eases pain with putting arm in resting cradled position, ibuprofen). Past Medical History/Comorbidities:   Ms. Christina Zimmerman  has no past medical history on file. Ms. Christina Zimmerman  has a past surgical history that includes hx other surgical.  Social History/Living Environment:    lives alone in apartment on ground floor (no steps to get up)  Prior Level of Function/Work/Activity:  Works as pharmacist (sits mostly at computer for about 3 hours at a time, working 8 hours a day, 5-7 days a week - has to work at [employer]); pt golfs  Dominant Side:         RIGHT  Other Clinical Tests:          none  Previous Treatment Approaches:          none  Personal Factors:          Sex:  female         Age:  29 y.o. Fall Risk:  Ambulatory/Rehab Services H2 Model Falls Risk Assessment   Risk Factors:       No Risk Factors Identified Ability to Rise from Chair:       (0)  Ability to rise in a single movement   Falls Prevention Plan:       No modifications necessary   Total: (5 or greater = High Risk): 0   ©2010 [facility] [employer]. All Rights Reserved. [facility] Patent #[id number].  Federal Law prohibits the replication, distribution or use without written permission from [facility] [employer]     Current Medications:    Current Outpatient Medications:     norelgestromin-ethinyl estradiol (ORTHO EVRA) 150-35 mcg/24 hr, 1 Patch by TransDERmal route Every Saturday. , Disp: 3 Patch, Rfl: 13    loratadine (CLARITIN) 10 mg tablet, Take 10 mg by mouth., Disp: , Rfl:    Date Last Reviewed:  12/14/2018   # of Personal Factors/Comorbidities that affect the Plan of Care: 0: LOW COMPLEXITY   EXAMINATION:   Initial assessment on 11/20/2018  Observation/Orthostatic Postural Assessment:    The following postural deficits were noted in sitting: forward head, rounded shoulders and posterior pelvic tilt   The following postural deficits were noted in standing: none of the above   Palpation:         Palpable tightness along R biceps brachii (short head and long head), brachialis, brachioradialis, pronator teres, wrist flexors (flexor carpi radialis, palmaris longus, flexor carpi ulnaris, flexor digitorum)   ROM:    Initial measurement: (on 11/20/2018) Initial measurement: (on 11/20/2018) Reassessment measurement:  Reassessment measurement:    L UE:  Elbow extension: slightly past 0 degrees (pt hyperextends slightly)  Elbow flexion: WFL R UE:  Elbow extension: 17 degrees flexion passively with pain  Elbow flexion: 147 degrees passively with pain in biceps       Initial measurement: (on 11/20/2018) Reassessment measurement: Reassessment measurement:    Cervical AROM 100%, slightly tight on R side per pt report    Thoracic AROM WFL            Strength:    Initial measurement: (on 11/20/2018) Initial measurement: (on 11/20/2018) Reassessment measurement:  Reassessment measurement:    L UE:  Shoulder flexion: 4/5  Shoulder extension: 5/5  Shoulder abduction: 4/5  Shoulder adduction: 5/5  Shoulder IR: 5/5  Shoulder ER: 4+/5  Elbow flexion: 5/5  Elbow extension: 5/5  Wrist flexion/extension: 5/5 R UE:  Shoulder flexion: 4-/5 (pain in bicep musculature)  Shoulder extension: 5/5  Shoulder abduction: 4/5 (sore in anterior elbow and radiating out)  Shoulder adduction: 5/5  Shoulder IR: 5/5  Shoulder ER: 4+/5  Elbow flexion: 4-/5 (pain in wrist flexors and bicep)  Elbow extension: 4/5  Wrist flexion/extension: 5/5       Special Tests:          Wrist extensor stretch: + for pain  Neurological Screen:        Myotomes:  WFL        Dermatomes:  No deficits noted     Body Structures Involved:  1. Nerves  2. Bones  3. Joints  4. Muscles  5. Ligaments Body Functions Affected:  1. Sensory/Pain  2. Neuromusculoskeletal  3. Movement Related Activities and Participation Affected:  1. General Tasks and Demands  2. Mobility  3. Self Care  4. Domestic Life  5. Interpersonal Interactions and Relationships  6. Community, Social and [place]   # of elements that affect the Plan of Care: 4+: HIGH COMPLEXITY   CLINICAL PRESENTATION:   Presentation: Evolving clinical presentation with changing clinical characteristics: MODERATE COMPLEXITY   CLINICAL DECISION MAKING:   Outcome Measure: Tool Used: Disabilities of the Arm, Shoulder and Hand (DASH) Questionnaire - Quick Version  Score:  Initial: 26/55  Most Recent: X/55 (Date: -- )   Interpretation of Score: The DASH is designed to measure the activities of daily living in person's with upper extremity dysfunction or pain. Each section is scored on a 1-5 scale, 5 representing the greatest disability. The scores of each section are added together for a total score of 55. Score 11 12-19 20-28 29-37 38-45 46-54 55   Modifier CH CI CJ CK CL CM CN     Payor: Fran Tavares / Plan: Rima Points / Product Type: PPO /   Medical Necessity:   · Patient is expected to demonstrate progress in strength, range of motion and functional technique to improve ability to perform pain-free ADLs/IADLs adn to improve overall quality of life. · Patient demonstrates good rehab potential due to higher previous functional level.   Reason for Services/Other Comments:  · Patient continues to require modification of therapeutic interventions to increase complexity of exercises. Use of outcome tool(s) and clinical judgement create a POC that gives a: Questionable prediction of patient's progress: MODERATE COMPLEXITY   TREATMENT:   (In addition to Assessment/Re-Assessment sessions the following treatments were rendered)  Subjective comments at beginning of session: Pt reports no pain or arm symptoms for the past week. Just tightness noted pointing to right upper trap and upper back. Therapeutic Exercise: ( 15 minutes):  Exercises per grid below to improve mobility and strength. Required minimal verbal cues to promote proper body alignment, promote proper body posture, promote proper body mechanics and promote proper body breathing techniques. Progressed resistance, range, repetitions and complexity of movement as indicated. Date:  12/14/18 Date:   Date:     Activity/Exercise Parameters Parameters Parameters   Postural Education 5 minutes and sitting posture and positioning     Shoulder rows   1 x 10 reps without resistance  1 x 5 reps with yellow theraband     Shoulder extension   1 x 10 reps without resistance  1 x 5 reps with yellow theraband     \"W\" exercises   1 x 10 reps      Bilateral shoulder external rotation 1 x 10 reps without resistance  1 x 5 reps with yellow theraband                   HEP: Patient issued written HEP of exercises per flow sheet above and also issued yellow theraband for progression with exercises at home. MANUAL THERAPY: (25 minutes): Joint mobilization and Soft tissue mobilization was utilized and necessary because of the patient's restricted joint motion, painful spasm, loss of articular motion and restricted motion of soft tissue. Patient sitting with right arm supported for STM to right upper arm from shoulder to elbow area and then STM to right neck, upper trap and along scapular border where tightness was noted in all areas. Trigger point release to right upper trap and levator areas.  Decreased tightness and palpable tenderness in this area noted since last visit. Patient prone for STM to right scapular border area. MODALITIES: (15 MINUTES) Patient supine with knee wedge in place for moist heat to bilateral upper traps and along scapular border along with IFES at 18 ma to help decrease tightness and symptoms. Treatment/Session Assessment:  Pt responding well with pain and symptoms resolved in the right UE, upper trap and scapular border area with decreased tightness. Patient tolerated exercises well today with good understanding of HEP. Continue per plan of care and progress with postural and shoulder strengthening exercises. · Pain/ Symptoms: Initial:   No pain reported at beginning of session  Post Session:  No pain at end of session ·   Compliance with Program/Exercises: Will assess as treatment progresses. · Recommendations/Intent for next treatment session: \"Next visit will focus on advancements to more challenging activities and reduction in assistance provided\".  Manual therapy/modalities as needed to reduce pain; gentle strengthening and stretching exercises if needed; work on ergonomics for pt's work  Total Treatment Duration:  PT Patient Time In/Time Out  Time In: 0915  Time Out: Kavya 328, PTA    Future Appointments   Date Time Provider Moises Vuong   12/20/2018  9:30 AM Geovanna Dougherty DPT [facility]   11/14/2019  9:15 AM Wyatt Yee MD [address]

## 2018-12-20 ENCOUNTER — HOSPITAL ENCOUNTER (OUTPATIENT)
Dept: PHYSICAL THERAPY | Age: 33
Discharge: HOME | End: 2018-12-20
Payer: COMMERCIAL

## 2018-12-20 NOTE — PROGRESS NOTES
Therapy Center at [place] [address] [address] [facility]  [phone]   [phone]     OUTPATIENT DAILY NOTE    NAME: Yolanda Booker    DATE: 12/20/2018    Patient canceled physical therapy appointment today due to schedule conflict. Will plan to follow up on next scheduled visit.     Alaina Guillaume DPT

## 2019-07-10 NOTE — THERAPY DISCHARGE
Niya Puckett  : 1985  Primary: [address]*  Secondary:  [address]  at [facility]  [address], [address] [facility], [address]  [phone]   [phone]       OUTPATIENT PHYSICAL THERAPY:Discontinuation Summary 2018    ICD-10: Treatment Diagnosis: Pain in right elbow (M25.521)    Pain in right arm (M79.601)  Precautions/Allergies:   Patient has no allergy information on record. MD Orders: Evaluate and treat MEDICAL/REFERRING DIAGNOSIS:  Right elbow pain [M25.521]   DATE OF ONSET: beginning of 2018  REFERRING PHYSICIAN: Marcus Bui DO  RETURN PHYSICIAN APPOINTMENT: unspecified   This section established at most recent assessment  Niya Puckett has been seen in physical therapy from 2018 to 2018 for 4 visits. Treatment has been discontinued at this time due to patient noncompliant with attending/scheduling PT sessions. The below goals were not reassessed secondary to pt failing to attend additional sessions prior to discharge. Thank you for this referral.    PROBLEM LIST (Impacting functional limitations):  1. Decreased Strength  2. Decreased ADL/Functional Activities  3. Increased Pain  4. Decreased Activity Tolerance  5. Decreased Work Simplification/Energy Conservation Techniques  6. Decreased Flexibility/Joint Mobility  7. Edema/Girth INTERVENTIONS PLANNED:  1. Bed Mobility  2. Cold  3. Electrical Stimulation  4. Heat  5. Home Exercise Program (HEP)  6. Manual Therapy  7. Neuromuscular Re-education/Strengthening  8. Range of Motion (ROM)  9. Therapeutic Activites  10. Therapeutic Exercise/Strengthening  11. Transcutaneous Electrical Nerve Stimulation (TENS)  12. Ultrasound   TREATMENT PLAN:  Effective Dates/Frequency/Duration: Once per week from 2018 till 2019 (45 Days). .  GOALS: (Goals have been discussed and agreed upon with patient.)  Discharge Goals: Time Frame: 45 days  1.  Pt will be compliant and independent with HEP in order to increase UE strength/endurance/mobility to improve functional mobility and overall quality of life. 2. Pt will improve score on the Disabilities of the Arm, Shoulder, and Hand (DASH) Questionnaire to 11/55 in order to improve independence with ADLs and IADLs and to improve overall quality of life. 3. Pt will increase R elbow extension PROM to 0 degrees with minimal to no increase in pain in order to improve functional mobility and ability to reach further distance. 4. Pt will be able to open a heavy door with minimal to no increase in pain in order to be able to demonstrate improved mobility.   Rehabilitation Potential For Stated Goals: Good    Thank you for this referral,  Garrett Nichols DPT

## 2019-10-08 PROBLEM — O30.049 DICHORIONIC DIAMNIOTIC TWIN GESTATION: Status: ACTIVE | Noted: 2019-10-08

## 2019-10-29 PROBLEM — O44.00 PLACENTA PREVIA: Status: ACTIVE | Noted: 2019-10-29

## 2019-11-07 ENCOUNTER — HOSPITAL ENCOUNTER (EMERGENCY)
Age: 34
Discharge: HOME | End: 2019-11-07
Attending: EMERGENCY MEDICINE
Payer: COMMERCIAL

## 2019-11-07 ENCOUNTER — APPOINTMENT (OUTPATIENT)
Dept: ULTRASOUND IMAGING | Age: 34
End: 2019-11-07
Attending: EMERGENCY MEDICINE
Payer: COMMERCIAL

## 2019-11-07 VITALS
BODY MASS INDEX: 21 KG/M2 | WEIGHT: 150 LBS | SYSTOLIC BLOOD PRESSURE: 120 MMHG | RESPIRATION RATE: 16 BRPM | TEMPERATURE: 98.1 F | HEART RATE: 80 BPM | OXYGEN SATURATION: 100 % | DIASTOLIC BLOOD PRESSURE: 70 MMHG | HEIGHT: 71 IN

## 2019-11-07 DIAGNOSIS — O20.9 VAGINAL BLEEDING IN PREGNANCY, FIRST TRIMESTER: Primary | ICD-10-CM

## 2019-11-07 LAB
ANION GAP SERPL CALC-SCNC: 8 MMOL/L (ref 7–16)
BASOPHILS # BLD: 0 K/UL (ref 0–0.2)
BASOPHILS NFR BLD: 0 % (ref 0–2)
BUN SERPL-MCNC: 10 MG/DL (ref 6–23)
CALCIUM SERPL-MCNC: 8.9 MG/DL (ref 8.3–10.4)
CHLORIDE SERPL-SCNC: 107 MMOL/L (ref 98–107)
CO2 SERPL-SCNC: 24 MMOL/L (ref 21–32)
CREAT SERPL-MCNC: 0.56 MG/DL (ref 0.6–1)
DIFFERENTIAL METHOD BLD: ABNORMAL
EOSINOPHIL # BLD: 0.1 K/UL (ref 0–0.8)
EOSINOPHIL NFR BLD: 2 % (ref 0.5–7.8)
ERYTHROCYTE [DISTWIDTH] IN BLOOD BY AUTOMATED COUNT: 12.8 % (ref 11.9–14.6)
GLUCOSE SERPL-MCNC: 82 MG/DL (ref 65–100)
HCG SERPL-ACNC: ABNORMAL MIU/ML (ref 0–6)
HCG UR QL: POSITIVE
HCT VFR BLD AUTO: 34.3 % (ref 35.8–46.3)
HGB BLD-MCNC: 11.7 G/DL (ref 11.7–15.4)
IMM GRANULOCYTES # BLD AUTO: 0 K/UL (ref 0–0.5)
IMM GRANULOCYTES NFR BLD AUTO: 0 % (ref 0–5)
LYMPHOCYTES # BLD: 1.8 K/UL (ref 0.5–4.6)
LYMPHOCYTES NFR BLD: 24 % (ref 13–44)
MCH RBC QN AUTO: 31.2 PG (ref 26.1–32.9)
MCHC RBC AUTO-ENTMCNC: 34.1 G/DL (ref 31.4–35)
MCV RBC AUTO: 91.5 FL (ref 79.6–97.8)
MONOCYTES # BLD: 0.5 K/UL (ref 0.1–1.3)
MONOCYTES NFR BLD: 7 % (ref 4–12)
NEUTS SEG # BLD: 5.2 K/UL (ref 1.7–8.2)
NEUTS SEG NFR BLD: 67 % (ref 43–78)
NRBC # BLD: 0 K/UL (ref 0–0.2)
PLATELET # BLD AUTO: 150 K/UL (ref 150–450)
PMV BLD AUTO: 11.4 FL (ref 9.4–12.3)
POTASSIUM SERPL-SCNC: 3.2 MMOL/L (ref 3.5–5.1)
RBC # BLD AUTO: 3.75 M/UL (ref 4.05–5.2)
SODIUM SERPL-SCNC: 139 MMOL/L (ref 136–145)
WBC # BLD AUTO: 7.7 K/UL (ref 4.3–11.1)

## 2019-11-07 PROCEDURE — 80048 BASIC METABOLIC PNL TOTAL CA: CPT

## 2019-11-07 PROCEDURE — 84702 CHORIONIC GONADOTROPIN TEST: CPT

## 2019-11-07 PROCEDURE — 81003 URINALYSIS AUTO W/O SCOPE: CPT | Performed by: EMERGENCY MEDICINE

## 2019-11-07 PROCEDURE — 81025 URINE PREGNANCY TEST: CPT

## 2019-11-07 PROCEDURE — 76802 OB US < 14 WKS ADDL FETUS: CPT

## 2019-11-07 PROCEDURE — 85025 COMPLETE CBC W/AUTO DIFF WBC: CPT

## 2019-11-07 PROCEDURE — 99284 EMERGENCY DEPT VISIT MOD MDM: CPT | Performed by: EMERGENCY MEDICINE

## 2019-11-08 ENCOUNTER — PATIENT OUTREACH (OUTPATIENT)
Dept: OTHER | Age: 34
End: 2019-11-08

## 2019-11-08 NOTE — ED PROVIDER NOTES
Patient is a 35 yo female who presents with vaginal bleeding in her first trimester. States pregnant with twins and states has bled enough to go through 4 small panty liners. Blood is bright red, no clots, no abdominal pain, no further complaints. History reviewed. No pertinent past medical history.     Past Surgical History:   Procedure Laterality Date    HX OTHER SURGICAL      intussusception as a baby    HX WISDOM TEETH EXTRACTION           Family History:   Problem Relation Age of Onset    Depression Mother     Other Father         paroxysmal afib    Depression Brother         29year old     No Known Problems Brother     No Known Problems Brother     Breast Cancer Neg Hx     Ovarian Cancer Neg Hx     Colon Cancer Neg Hx     Diabetes Neg Hx     Hypertension Neg Hx        Social History     Socioeconomic History    Marital status:      Spouse name: Not on file    Number of children: Not on file    Years of education: Not on file    Highest education level: Not on file   Occupational History    Not on file   Social Needs    Financial resource strain: Not on file    Food insecurity:     Worry: Not on file     Inability: Not on file    Transportation needs:     Medical: Not on file     Non-medical: Not on file   Tobacco Use    Smoking status: Never Smoker    Smokeless tobacco: Never Used   Substance and Sexual Activity    Alcohol use: Not Currently    Drug use: No    Sexual activity: Yes     Partners: Male     Birth control/protection: None   Lifestyle    Physical activity:     Days per week: Not on file     Minutes per session: Not on file    Stress: Not on file   Relationships    Social connections:     Talks on phone: Not on file     Gets together: Not on file     Attends Yazidi service: Not on file     Active member of club or organization: Not on file     Attends meetings of clubs or organizations: Not on file     Relationship status: Not on file    Intimate partner violence:     Fear of current or ex partner: Not on file     Emotionally abused: Not on file     Physically abused: Not on file     Forced sexual activity: Not on file   Other Topics Concern    Not on file   Social History Narrative    Not on file         ALLERGIES: Patient has no known allergies. Review of Systems   Constitutional: Negative for chills and fever. HENT: Negative for rhinorrhea and sore throat. Eyes: Negative for visual disturbance. Respiratory: Negative for cough and shortness of breath. Cardiovascular: Negative for chest pain and leg swelling. Gastrointestinal: Negative for abdominal pain, diarrhea, nausea and vomiting. Genitourinary: Positive for vaginal bleeding. Negative for dysuria. Musculoskeletal: Negative for back pain and neck pain. Skin: Negative for rash. Neurological: Negative for weakness and headaches. Psychiatric/Behavioral: The patient is not nervous/anxious. Vitals:    11/07/19 2011   BP: 115/72   Pulse: 88   Resp: 16   Temp: 98.5 °F (36.9 °C)   SpO2: 100%   Weight: 68 kg (150 lb)   Height: 5' 11\" (1.803 m)            Physical Exam   Constitutional: She is oriented to person, place, and time. She appears well-developed and well-nourished. HENT:   Head: Normocephalic. Right Ear: External ear normal.   Left Ear: External ear normal.   Eyes: Pupils are equal, round, and reactive to light. Conjunctivae and EOM are normal.   Neck: Normal range of motion. Neck supple. No tracheal deviation present. Cardiovascular: Normal rate, regular rhythm, normal heart sounds and intact distal pulses. No murmur heard. Pulmonary/Chest: Effort normal and breath sounds normal. No respiratory distress. Abdominal: Soft. There is no tenderness. Genitourinary:   Genitourinary Comments: Cervix closed, mild blood in vault with no active bleeding. Musculoskeletal: Normal range of motion. Neurological: She is alert and oriented to person, place, and time. No cranial nerve deficit. Skin: No rash noted. Nursing note and vitals reviewed. MDM  Number of Diagnoses or Management Options  Vaginal bleeding in pregnancy, first trimester: new and requires workup     Amount and/or Complexity of Data Reviewed  Clinical lab tests: ordered and reviewed  Tests in the radiology section of CPT®: ordered and reviewed  Review and summarize past medical records: yes    Risk of Complications, Morbidity, and/or Mortality  Presenting problems: high  Diagnostic procedures: high  Management options: high    Patient Progress  Patient progress: stable         Procedures    Recent Results (from the past 12 hour(s))   HCG URINE, QL. - POC    Collection Time: 11/07/19  8:16 PM   Result Value Ref Range    Pregnancy test,urine (POC) POSITIVE (A) NEG     BETA HCG, QT    Collection Time: 11/07/19  9:06 PM   Result Value Ref Range    Beta HCG, ,662 (H) 0.0 - 6.0 MIU/ML   CBC WITH AUTOMATED DIFF    Collection Time: 11/07/19  9:06 PM   Result Value Ref Range    WBC 7.7 4.3 - 11.1 K/uL    RBC 3.75 (L) 4.05 - 5.2 M/uL    HGB 11.7 11.7 - 15.4 g/dL    HCT 34.3 (L) 35.8 - 46.3 %    MCV 91.5 79.6 - 97.8 FL    MCH 31.2 26.1 - 32.9 PG    MCHC 34.1 31.4 - 35.0 g/dL    RDW 12.8 11.9 - 14.6 %    PLATELET 541 025 - 209 K/uL    MPV 11.4 9.4 - 12.3 FL    ABSOLUTE NRBC 0.00 0.0 - 0.2 K/uL    DF AUTOMATED      NEUTROPHILS 67 43 - 78 %    LYMPHOCYTES 24 13 - 44 %    MONOCYTES 7 4.0 - 12.0 %    EOSINOPHILS 2 0.5 - 7.8 %    BASOPHILS 0 0.0 - 2.0 %    IMMATURE GRANULOCYTES 0 0.0 - 5.0 %    ABS. NEUTROPHILS 5.2 1.7 - 8.2 K/UL    ABS. LYMPHOCYTES 1.8 0.5 - 4.6 K/UL    ABS. MONOCYTES 0.5 0.1 - 1.3 K/UL    ABS. EOSINOPHILS 0.1 0.0 - 0.8 K/UL    ABS. BASOPHILS 0.0 0.0 - 0.2 K/UL    ABS. IMM.  GRANS. 0.0 0.0 - 0.5 K/UL   METABOLIC PANEL, BASIC    Collection Time: 11/07/19  9:06 PM   Result Value Ref Range    Sodium 139 136 - 145 mmol/L    Potassium 3.2 (L) 3.5 - 5.1 mmol/L    Chloride 107 98 - 107 mmol/L    CO2 24 21 - 32 mmol/L    Anion gap 8 7 - 16 mmol/L    Glucose 82 65 - 100 mg/dL    BUN 10 6 - 23 MG/DL    Creatinine 0.56 (L) 0.6 - 1.0 MG/DL    GFR est AA >60 >60 ml/min/1.73m2    GFR est non-AA >60 >60 ml/min/1.73m2    Calcium 8.9 8.3 - 10.4 MG/DL     [employer]    Result Date: 2019  TRANSABDOMINAL AND TRANSVAGINAL PELVIC SONOGRAPHY: CLINICAL HISTORY:  69-year-old  1 with known twin pregnancy presents with light vaginal bleeding for 5 hours. COMPARISON:  None. FINDINGS:  Transabdominal and transvaginal images show that the uterus measures 16.4 x 11.4 x 11.6 cm. Twin intrauterine gestation is confirmed with positive fetal heart motion in both and a membrane between the fetuses. Crown-rump lengths corresponding to approximate menstrual ages of 14 weeks, 0 days for twin A and 13 weeks, 6 days for twin B +/- 9 days. No subchorionic hemorrhage is identified. Neither ovary could be confidently identified, which is not unexpected at this stage of gestation. Amniotic fluid volumes and the cervix are grossly unremarkable. IMPRESSION:  TWIN GESTATION AT APPROXIMATELY 14 WEEKS WITH POSITIVE FETAL HEART MOTION IN BOTH FETUSES. NO SUBCHORIONIC HEMORRHAGE OR OTHER ACUTE ABNORMALITY IS IDENTIFIED. 37 yo female with first trimester bleeding:       I discussed patient with on call OB hospitalist who agrees with plan for discharge and follow up with Dr. Jim Patel tomorrow for recheck or return with any severe or worsening bleeding or any abdominal or pelvic pain or any further concerns. Patient is very well appearing and in NAD. Vaginal exam with some mild continued bleeding from cervix which appears closed.

## 2019-11-08 NOTE — DISCHARGE INSTRUCTIONS
RETURN IMMEDIATELY WITH WORSENING BLEEDING OR ANY PAIN OR ANY FEVERS OR CHILLS, NAUSEA OR VOMITING OR ANY FURTHER CONCERNS.

## 2019-11-08 NOTE — ED NOTES
I have reviewed discharge instructions with the patient. The patient verbalized understanding. Patient left ED via Discharge Method: ambulatory to Home with spouse. Opportunity for questions and clarification provided. Patient given 0 scripts. To continue your aftercare when you leave the hospital, you may receive an automated call from our care team to check in on how you are doing. This is a free service and part of our promise to provide the best care and service to meet your aftercare needs.  If you have questions, or wish to unsubscribe from this service please call [phone]. Thank you for Choosing our [employer] [facility] Department.

## 2019-11-08 NOTE — ED TRIAGE NOTES
approx 13 weeks pregnant and started having vaginal bleeding tonight. Bright red and has gone thru approx 5 panty liners since 6pm tonight. Denies any pain.   Pregnant with twins

## 2019-11-08 NOTE — PROGRESS NOTES
Verified  and address for HIPAA security. Introduced [employer] for patient. Patient does not identify any Care Management needs at this time and declines services. Patient is open to contact in the future. She does not feel services are needed at this time. Has had follow up appointment with her OB/GYN.

## 2019-11-13 PROBLEM — O34.00 BICORNATE UTERUS COMPLICATING PREGNANCY: Status: ACTIVE | Noted: 2019-11-13

## 2019-11-13 PROBLEM — Q51.3 BICORNATE UTERUS COMPLICATING PREGNANCY: Status: ACTIVE | Noted: 2019-11-13

## 2019-11-13 PROBLEM — J30.2 SEASONAL ALLERGIC RHINITIS: Status: RESOLVED | Noted: 2018-05-04 | Resolved: 2019-11-13

## 2019-11-13 PROBLEM — O30.042 DICHORIONIC DIAMNIOTIC TWIN PREGNANCY IN SECOND TRIMESTER: Status: ACTIVE | Noted: 2019-10-08

## 2019-11-15 PROBLEM — O44.02 PLACENTA PREVIA IN SECOND TRIMESTER: Status: ACTIVE | Noted: 2019-10-29

## 2019-11-15 PROBLEM — O34.02 UTERUS BICORNIS AFFECTING PREGNANCY IN SECOND TRIMESTER: Status: ACTIVE | Noted: 2019-11-13

## 2019-12-02 ENCOUNTER — HOSPITAL ENCOUNTER (OUTPATIENT)
Age: 34
Setting detail: OBSERVATION
Discharge: HOME | End: 2019-12-04
Attending: OBSTETRICS & GYNECOLOGY | Admitting: OBSTETRICS & GYNECOLOGY
Payer: COMMERCIAL

## 2019-12-02 DIAGNOSIS — O34.32 CERVICAL INSUFFICIENCY DURING PREGNANCY IN SECOND TRIMESTER, ANTEPARTUM: ICD-10-CM

## 2019-12-02 DIAGNOSIS — O30.042 DICHORIONIC DIAMNIOTIC TWIN PREGNANCY IN SECOND TRIMESTER: ICD-10-CM

## 2019-12-02 PROBLEM — O26.872 SHORT CERVIX DURING PREGNANCY IN SECOND TRIMESTER: Status: ACTIVE | Noted: 2019-12-02

## 2019-12-02 PROCEDURE — 99218 HC RM OBSERVATION: CPT

## 2019-12-02 PROCEDURE — 74011000258 HC RX REV CODE- 258: Performed by: OBSTETRICS & GYNECOLOGY

## 2019-12-02 PROCEDURE — 65270000029 HC RM PRIVATE

## 2019-12-02 PROCEDURE — 74011250637 HC RX REV CODE- 250/637: Performed by: OBSTETRICS & GYNECOLOGY

## 2019-12-02 PROCEDURE — 74011250636 HC RX REV CODE- 250/636: Performed by: OBSTETRICS & GYNECOLOGY

## 2019-12-02 RX ORDER — SODIUM CHLORIDE 0.9 % (FLUSH) 0.9 %
5-40 SYRINGE (ML) INJECTION AS NEEDED
Status: DISCONTINUED | OUTPATIENT
Start: 2019-12-02 | End: 2019-12-04 | Stop reason: HOSPADM

## 2019-12-02 RX ORDER — AZITHROMYCIN 250 MG/1
250 TABLET, FILM COATED ORAL DAILY
Status: DISCONTINUED | OUTPATIENT
Start: 2019-12-03 | End: 2019-12-04 | Stop reason: HOSPADM

## 2019-12-02 RX ORDER — ZOLPIDEM TARTRATE 5 MG/1
5 TABLET ORAL
Status: DISCONTINUED | OUTPATIENT
Start: 2019-12-02 | End: 2019-12-04 | Stop reason: HOSPADM

## 2019-12-02 RX ORDER — INDOMETHACIN 50 MG/1
50 CAPSULE ORAL EVERY 6 HOURS
Status: DISCONTINUED | OUTPATIENT
Start: 2019-12-02 | End: 2019-12-04 | Stop reason: HOSPADM

## 2019-12-02 RX ORDER — FAMOTIDINE 20 MG/1
20 TABLET, FILM COATED ORAL EVERY 12 HOURS
Status: DISCONTINUED | OUTPATIENT
Start: 2019-12-02 | End: 2019-12-04 | Stop reason: HOSPADM

## 2019-12-02 RX ORDER — CHOLECALCIFEROL (VITAMIN D3) 125 MCG
CAPSULE ORAL
COMMUNITY
End: 2020-06-09

## 2019-12-02 RX ORDER — LANOLIN ALCOHOL/MO/W.PET/CERES
CREAM (GRAM) TOPICAL
COMMUNITY
End: 2020-05-01

## 2019-12-02 RX ORDER — METRONIDAZOLE 500 MG/100ML
500 INJECTION, SOLUTION INTRAVENOUS EVERY 12 HOURS
Status: DISCONTINUED | OUTPATIENT
Start: 2019-12-02 | End: 2019-12-04 | Stop reason: HOSPADM

## 2019-12-02 RX ORDER — AZITHROMYCIN 250 MG/1
500 TABLET, FILM COATED ORAL ONCE
Status: COMPLETED | OUTPATIENT
Start: 2019-12-02 | End: 2019-12-02

## 2019-12-02 RX ORDER — DEXTROSE, SODIUM CHLORIDE, SODIUM LACTATE, POTASSIUM CHLORIDE, AND CALCIUM CHLORIDE 5; .6; .31; .03; .02 G/100ML; G/100ML; G/100ML; G/100ML; G/100ML
100 INJECTION, SOLUTION INTRAVENOUS CONTINUOUS
Status: DISCONTINUED | OUTPATIENT
Start: 2019-12-02 | End: 2019-12-04 | Stop reason: HOSPADM

## 2019-12-02 RX ORDER — SODIUM CHLORIDE 0.9 % (FLUSH) 0.9 %
5-40 SYRINGE (ML) INJECTION EVERY 8 HOURS
Status: DISCONTINUED | OUTPATIENT
Start: 2019-12-02 | End: 2019-12-04 | Stop reason: HOSPADM

## 2019-12-02 RX ADMIN — SODIUM CHLORIDE, SODIUM LACTATE, POTASSIUM CHLORIDE, CALCIUM CHLORIDE, AND DEXTROSE MONOHYDRATE 100 ML/HR: 600; 310; 30; 20; 5 INJECTION, SOLUTION INTRAVENOUS at 20:05

## 2019-12-02 RX ADMIN — AZITHROMYCIN MONOHYDRATE 500 MG: 250 TABLET ORAL at 19:55

## 2019-12-02 RX ADMIN — AMPICILLIN SODIUM 2 G: 2 INJECTION, POWDER, FOR SOLUTION INTRAMUSCULAR; INTRAVENOUS at 21:07

## 2019-12-02 RX ADMIN — FAMOTIDINE 20 MG: 20 TABLET ORAL at 19:56

## 2019-12-02 RX ADMIN — INDOMETHACIN 50 MG: 50 CAPSULE ORAL at 19:56

## 2019-12-02 RX ADMIN — METRONIDAZOLE 500 MG: 500 INJECTION, SOLUTION INTRAVENOUS at 20:05

## 2019-12-03 ENCOUNTER — ANESTHESIA EVENT (OUTPATIENT)
Dept: LABOR AND DELIVERY | Age: 34
End: 2019-12-03
Payer: COMMERCIAL

## 2019-12-03 ENCOUNTER — ANESTHESIA (OUTPATIENT)
Dept: LABOR AND DELIVERY | Age: 34
End: 2019-12-03
Payer: COMMERCIAL

## 2019-12-03 PROCEDURE — 76010000389 HC LDRP PROCEDURE 0.5 TO 1 HR: Performed by: OBSTETRICS & GYNECOLOGY

## 2019-12-03 PROCEDURE — 74011000258 HC RX REV CODE- 258: Performed by: OBSTETRICS & GYNECOLOGY

## 2019-12-03 PROCEDURE — 74011000250 HC RX REV CODE- 250: Performed by: ANESTHESIOLOGY

## 2019-12-03 PROCEDURE — 77030003665 HC NDL SPN BBMI -A: Performed by: NURSE ANESTHETIST, CERTIFIED REGISTERED

## 2019-12-03 PROCEDURE — 74011250637 HC RX REV CODE- 250/637: Performed by: OBSTETRICS & GYNECOLOGY

## 2019-12-03 PROCEDURE — 99218 HC RM OBSERVATION: CPT

## 2019-12-03 PROCEDURE — 77030020254 HC SOL INJ D5LR LACTATED RINGER

## 2019-12-03 PROCEDURE — 74011250636 HC RX REV CODE- 250/636: Performed by: OBSTETRICS & GYNECOLOGY

## 2019-12-03 PROCEDURE — 74011250636 HC RX REV CODE- 250/636: Performed by: NURSE ANESTHETIST, CERTIFIED REGISTERED

## 2019-12-03 PROCEDURE — 76060000032 HC ANESTHESIA 0.5 TO 1 HR: Performed by: OBSTETRICS & GYNECOLOGY

## 2019-12-03 PROCEDURE — 77030002986 HC SUT PROL J&J -A: Performed by: OBSTETRICS & GYNECOLOGY

## 2019-12-03 PROCEDURE — 76210000064 HC RECOV POST SURG EA 0.5 HR: Performed by: OBSTETRICS & GYNECOLOGY

## 2019-12-03 PROCEDURE — 77030018846 HC SOL IRR STRL H20 ICUM -A: Performed by: OBSTETRICS & GYNECOLOGY

## 2019-12-03 PROCEDURE — 74011000250 HC RX REV CODE- 250: Performed by: NURSE ANESTHETIST, CERTIFIED REGISTERED

## 2019-12-03 PROCEDURE — 77030007880 HC KT SPN EPDRL BBMI -B: Performed by: NURSE ANESTHETIST, CERTIFIED REGISTERED

## 2019-12-03 RX ORDER — EPHEDRINE SULFATE/0.9% NACL/PF 50 MG/5 ML
SYRINGE (ML) INTRAVENOUS AS NEEDED
Status: DISCONTINUED | OUTPATIENT
Start: 2019-12-03 | End: 2019-12-03 | Stop reason: HOSPADM

## 2019-12-03 RX ORDER — BUPIVACAINE HYDROCHLORIDE 7.5 MG/ML
INJECTION, SOLUTION INTRASPINAL
Status: COMPLETED | OUTPATIENT
Start: 2019-12-03 | End: 2019-12-03

## 2019-12-03 RX ORDER — SODIUM CHLORIDE, SODIUM LACTATE, POTASSIUM CHLORIDE, CALCIUM CHLORIDE 600; 310; 30; 20 MG/100ML; MG/100ML; MG/100ML; MG/100ML
INJECTION, SOLUTION INTRAVENOUS
Status: DISCONTINUED | OUTPATIENT
Start: 2019-12-03 | End: 2019-12-03 | Stop reason: HOSPADM

## 2019-12-03 RX ORDER — ONDANSETRON 2 MG/ML
8 INJECTION INTRAMUSCULAR; INTRAVENOUS
Status: DISCONTINUED | OUTPATIENT
Start: 2019-12-03 | End: 2019-12-04 | Stop reason: HOSPADM

## 2019-12-03 RX ORDER — POLYETHYLENE GLYCOL 3350 17 G/17G
17 POWDER, FOR SOLUTION ORAL
Status: DISCONTINUED | OUTPATIENT
Start: 2019-12-03 | End: 2019-12-04 | Stop reason: HOSPADM

## 2019-12-03 RX ORDER — HYDROCODONE BITARTRATE AND ACETAMINOPHEN 5; 325 MG/1; MG/1
2 TABLET ORAL
Status: DISCONTINUED | OUTPATIENT
Start: 2019-12-03 | End: 2019-12-04 | Stop reason: HOSPADM

## 2019-12-03 RX ADMIN — SODIUM CHLORIDE, SODIUM LACTATE, POTASSIUM CHLORIDE, AND CALCIUM CHLORIDE: 600; 310; 30; 20 INJECTION, SOLUTION INTRAVENOUS at 08:26

## 2019-12-03 RX ADMIN — SODIUM CHLORIDE, SODIUM LACTATE, POTASSIUM CHLORIDE, AND CALCIUM CHLORIDE 2000 ML: 600; 310; 30; 20 INJECTION, SOLUTION INTRAVENOUS at 08:01

## 2019-12-03 RX ADMIN — AMPICILLIN SODIUM 1 G: 1 INJECTION, POWDER, FOR SOLUTION INTRAMUSCULAR; INTRAVENOUS at 03:09

## 2019-12-03 RX ADMIN — AMPICILLIN SODIUM 1 G: 1 INJECTION, POWDER, FOR SOLUTION INTRAMUSCULAR; INTRAVENOUS at 19:45

## 2019-12-03 RX ADMIN — AMPICILLIN SODIUM 1 G: 1 INJECTION, POWDER, FOR SOLUTION INTRAMUSCULAR; INTRAVENOUS at 14:04

## 2019-12-03 RX ADMIN — PHENYLEPHRINE HYDROCHLORIDE 100 MCG: 10 INJECTION INTRAVENOUS at 08:50

## 2019-12-03 RX ADMIN — INDOMETHACIN 50 MG: 50 CAPSULE ORAL at 03:08

## 2019-12-03 RX ADMIN — Medication 5 MG: at 09:00

## 2019-12-03 RX ADMIN — FAMOTIDINE 20 MG: 20 TABLET ORAL at 20:45

## 2019-12-03 RX ADMIN — INDOMETHACIN 50 MG: 50 CAPSULE ORAL at 14:57

## 2019-12-03 RX ADMIN — Medication 5 MG: at 08:50

## 2019-12-03 RX ADMIN — INDOMETHACIN 50 MG: 50 CAPSULE ORAL at 20:45

## 2019-12-03 RX ADMIN — BUPIVACAINE HYDROCHLORIDE IN DEXTROSE 11 MG: 7.5 INJECTION, SOLUTION SUBARACHNOID at 08:43

## 2019-12-03 RX ADMIN — AMPICILLIN SODIUM 1 G: 1 INJECTION, POWDER, FOR SOLUTION INTRAMUSCULAR; INTRAVENOUS at 09:44

## 2019-12-03 RX ADMIN — INDOMETHACIN 50 MG: 50 CAPSULE ORAL at 09:43

## 2019-12-03 RX ADMIN — AZITHROMYCIN MONOHYDRATE 250 MG: 250 TABLET ORAL at 18:04

## 2019-12-03 RX ADMIN — HYDROCODONE BITARTRATE AND ACETAMINOPHEN 1 TABLET: 5; 325 TABLET ORAL at 20:41

## 2019-12-03 RX ADMIN — FAMOTIDINE 20 MG: 20 TABLET ORAL at 08:01

## 2019-12-03 RX ADMIN — METRONIDAZOLE 500 MG: 500 INJECTION, SOLUTION INTRAVENOUS at 08:02

## 2019-12-03 RX ADMIN — METRONIDAZOLE 500 MG: 500 INJECTION, SOLUTION INTRAVENOUS at 20:30

## 2019-12-03 RX ADMIN — PHENYLEPHRINE HYDROCHLORIDE 100 MCG: 10 INJECTION INTRAVENOUS at 09:00

## 2019-12-03 NOTE — H&P
Interval H&P    Pt without complaints this am.   No cramping, VB, LOF. 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 All questions answered. Pt and spouse desire to proceed with ultrasound indicated cerclage.      Radha Talamantes MD

## 2019-12-03 NOTE — ANESTHESIA POSTPROCEDURE EVALUATION
Procedure(s):  CERCLAGE.    spinal    Anesthesia Post Evaluation      Multimodal analgesia: multimodal analgesia used between 6 hours prior to anesthesia start to PACU discharge  Patient location during evaluation: bedside  Patient participation: complete - patient participated  Level of consciousness: awake and alert  Pain score: 3  Pain management: adequate  Airway patency: patent  Anesthetic complications: no  Cardiovascular status: acceptable and hemodynamically stable  Respiratory status: acceptable  Hydration status: acceptable  Post anesthesia nausea and vomiting:  none      Vitals Value Taken Time   /59 12/3/2019 10:34 AM   Temp     Pulse 110 12/3/2019 10:34 AM   Resp     SpO2 100 % 12/3/2019 10:30 AM   Vitals shown include unvalidated device data.

## 2019-12-03 NOTE — PROGRESS NOTES
Chart reviewed -  at 17w0d with Estimated Date of Delivery: 20. Admitted for cerclage. No needs identified in chart review. SW will continue to follow and provide support as needed.     AMMY Mar   [phone]

## 2019-12-03 NOTE — OP NOTES
[facility] Cervical Cerclage Operative Note    Pre-operative Diagnosis: Cervical Shortening, Mullerian Anomaly, Twins  Post-operative Diagnosis: Same as Above    Surgeon:  Nola Hitchcock MD     Anesthesia: Spinal    Procedure: Wall Cerclage    Indications:  Bethany Bence presents with a clinical history consistent with cervical incompetence. Cervical cerclage was offered for treatment of cervical incompetence and the risks were explained in detail in the office and again in the hospital prior to surgery. These included risk of infection, bleeding, bladder and bowel damage and pregnancy loss, along with rupture of membranes now or later in pregnancy. Additional discussion re cerclage with twins. These complications were all explained in detail and questions answered. It was explained to the patient that she had the option of expectant management without cerclage placement. She understood that her care and treatment would not be affected by her choice and there was no pressure on her to choose this course of treatment. After a long discussion and clear understanding by the patient, the patient consented to the procedure prior to coming to the hospital and, again on admission to the hospital.     Procedure Details:   Bethany Bence was taken to the Operating Room where spinal anesthetic was administered. The patient was then placed in the dorsal lithotomy position. The vulva and distal vagina were then gently prepped with Betadine solution and draped in a sterile fashion to expose the vaginal introitus. The patient was then placed in Trendelenburg position to allow better visualization of the vaginal canal and the cervix. An I and O catheter was used to drain 400ml clear urine from the bladder. Using retractors, the cervix and distal vagina were visualized. The vaginal portion of the cervical length was moderately shortened; cervical position over to maternal right.   The external cervical os appeared to be open but the internal os was closed to manual exam. Fetal membranes were not visualized. The cervix and distal vagina were again gently washed carefully with Betadine solution and dried with sterile sponges. A long atraumatic Allis clamp was used to retract the cervix by grasping a significant portio of the cervix, carefully placed to avoid membranes at the 10 o'clock position. Using 5mm Mersaline, the first bite of the Wall stitch was placed at the 12 o'clock position on the cervix at the junction of the vaginal mucosa and the portio of the cervix with the suture placed through the cervical stroma, careful to avoid cervical canal and amniotic sac, between mucosa and cervical canal.  This procedure was repeated in 6  bites in a purse string fashion with sequential grasping and releasing of the cervix with the Allis clamp to retract the cervical stroma and allow for placement of suture at the junction of the vaginal mucosa and the portio of the cervix. Care was used in grasping the cervix to be atraumatic and to cause as little of trauma and bleeding as possible. The last stitch ended at approximately the 12 o'clock again, needle cut and both sutures were gently retracted to take up any slack in the suture and a finger placed in the cervix and retraction of both ends of suture and internal os noted to be closed with suture tension. The suture was evaluated visually in all quadrants and felt to be at the junction of the vaginal mucosa and the portio of the cervix without including any sidewall and without including bladder or bowel. The suture was then tied with 5 square knots. The cervix was checked again and visualized and the cervix remained pink with no significantly bleeding, internal os palpated and was tightly closed. A 2-0 silk suture was place through both ends of suture aproximately 1 cm distal to knots and then tied.       Cervix and vagina were again evaluated and no bleeding was noted. The cervix remained pink. Instruments and retractors were removed. A murrell catheter was placed. Rectal exam was performed and no sutures were noted in the rectum. At the end of the procedure, sponge, instrument and needle counts were noted to be correct. Estimated Blood Loss:  30cc    Suture Type: 5mm Mersaline    Number of Sutures: 1    Findings:  Cervix deviated to maternal right; friable cervical ectropion. Uncomplicated cerclage.        Signed By: Edmundo Mancera MD 12/3/2019

## 2019-12-03 NOTE — ANESTHESIA PROCEDURE NOTES
Spinal Block    Start time: 12/3/2019 8:42 AM  End time: 12/3/2019 8:44 AM  Performed by: Eugene Rebolledo MD  Authorized by: Eugene Rebolldeo MD     Pre-procedure:   Indications: at surgeon's request and primary anesthetic  Preanesthetic Checklist: patient identified, risks and benefits discussed, anesthesia consent, site marked, patient being monitored and timeout performed    Timeout Time: 08:41          Spinal Block:   Patient Position:  Seated  Prep Region:  Lumbar  Prep: DuraPrep      Location:  L2-3  Technique:  Single shot    Local Dose (mL):  3    Needle:   Needle Type:  Pencil-tip  Needle Gauge:  25 G  Attempts:  1      Events: CSF confirmed, no blood with aspiration and no paresthesia        Assessment:  Insertion:  Uncomplicated  Patient tolerance:  Patient tolerated the procedure well with no immediate complications

## 2019-12-03 NOTE — ANESTHESIA PREPROCEDURE EVALUATION
Relevant Problems   No relevant active problems       Anesthetic History   No history of anesthetic complications            Review of Systems / Medical History  Patient summary reviewed and pertinent labs reviewed    Pulmonary  Within defined limits                 Neuro/Psych   Within defined limits           Cardiovascular  Within defined limits                Exercise tolerance: >4 METS     GI/Hepatic/Renal     GERD: well controlled           Endo/Other  Within defined limits           Other Findings              Physical Exam    Airway  Mallampati: II  TM Distance: 4 - 6 cm  Neck ROM: normal range of motion   Mouth opening: Normal     Cardiovascular  Regular rate and rhythm,  S1 and S2 normal,  no murmur, click, rub, or gallop             Dental         Pulmonary  Breath sounds clear to auscultation               Abdominal         Other Findings            Anesthetic Plan    ASA: 2  Anesthesia type: spinal            Anesthetic plan and risks discussed with: Patient and Spouse

## 2019-12-03 NOTE — PROGRESS NOTES
MFM      Pt seen. Doing well. Has had some spotting but no cramping. No LOF. No complaints. Questions answered re plan of care. Recommend working remotely. MFM in 1 week. Continue indocin x 15 total doses. Continue antibiotics at this time.        Amos Palomares MD

## 2019-12-04 VITALS
RESPIRATION RATE: 18 BRPM | HEART RATE: 82 BPM | SYSTOLIC BLOOD PRESSURE: 103 MMHG | DIASTOLIC BLOOD PRESSURE: 59 MMHG | TEMPERATURE: 98.2 F | OXYGEN SATURATION: 100 %

## 2019-12-04 PROCEDURE — 74011250636 HC RX REV CODE- 250/636: Performed by: OBSTETRICS & GYNECOLOGY

## 2019-12-04 PROCEDURE — 99238 HOSP IP/OBS DSCHRG MGMT 30/<: CPT | Performed by: OBSTETRICS & GYNECOLOGY

## 2019-12-04 PROCEDURE — 74011250637 HC RX REV CODE- 250/637: Performed by: OBSTETRICS & GYNECOLOGY

## 2019-12-04 PROCEDURE — 99218 HC RM OBSERVATION: CPT

## 2019-12-04 PROCEDURE — 74011000258 HC RX REV CODE- 258: Performed by: OBSTETRICS & GYNECOLOGY

## 2019-12-04 RX ORDER — FAMOTIDINE 20 MG/1
20 TABLET, FILM COATED ORAL EVERY 12 HOURS
Qty: 60 TAB | Refills: 5 | Status: SHIPPED | OUTPATIENT
Start: 2019-12-04 | End: 2020-05-14

## 2019-12-04 RX ORDER — AZITHROMYCIN 250 MG/1
250 TABLET, FILM COATED ORAL DAILY
Qty: 4 TAB | Refills: 0 | Status: SHIPPED | OUTPATIENT
Start: 2019-12-04 | End: 2019-12-19 | Stop reason: ALTCHOICE

## 2019-12-04 RX ORDER — INDOMETHACIN 50 MG/1
50 CAPSULE ORAL EVERY 6 HOURS
Qty: 48 CAP | Refills: 0 | Status: SHIPPED | OUTPATIENT
Start: 2019-12-04 | End: 2019-12-16

## 2019-12-04 RX ADMIN — SODIUM CHLORIDE, SODIUM LACTATE, POTASSIUM CHLORIDE, CALCIUM CHLORIDE, AND DEXTROSE MONOHYDRATE 100 ML/HR: 600; 310; 30; 20; 5 INJECTION, SOLUTION INTRAVENOUS at 06:43

## 2019-12-04 RX ADMIN — INDOMETHACIN 50 MG: 50 CAPSULE ORAL at 08:12

## 2019-12-04 RX ADMIN — FAMOTIDINE 20 MG: 20 TABLET ORAL at 08:12

## 2019-12-04 RX ADMIN — INDOMETHACIN 50 MG: 50 CAPSULE ORAL at 02:30

## 2019-12-04 RX ADMIN — AMPICILLIN SODIUM 1 G: 1 INJECTION, POWDER, FOR SOLUTION INTRAMUSCULAR; INTRAVENOUS at 08:12

## 2019-12-04 RX ADMIN — AMPICILLIN SODIUM 1 G: 1 INJECTION, POWDER, FOR SOLUTION INTRAMUSCULAR; INTRAVENOUS at 02:30

## 2019-12-04 RX ADMIN — METRONIDAZOLE 500 MG: 500 INJECTION, SOLUTION INTRAVENOUS at 10:10

## 2019-12-04 NOTE — DISCHARGE SUMMARY
Antepartum Discharge Summary         Admit Date: 2019    Discharge Date: 2019     Admitting Physician: Angie Sutherland MD     Admission Diagnoses: Cervical insufficiency during pregnancy in second trimester, antepartum [O34.32]    Discharge Diagnoses: Same as above     Additional Diagnoses: Twins    History of Present Illness:   OB History        1    Para   0    Term   0       0    AB   0    Living   0       SAB   0    TAB   0    Ectopic   0    Molar   0    Multiple   0    Live Births   0              36 y.o.  at [facility] Course:   Patient was admitted and underwent Wall cerclage placement without complication. Denies cramping, leaking or bleeding now. Vitals:   Patient Vitals for the past 24 hrs:   BP   19 0812 103/59   19 0241 96/57   19 1949 109/61   19 1332 109/61   19 1230 107/62   19 1130 111/60   19 1034 119/59     Temp (24hrs), Av.2 °F (36.8 °C), Min:98 °F (36.7 °C), Max:98.3 °F (36.8 °C)    I&O:    0701 -  1900  In: -   Out: 800 [Urine:800]    1901 -  0700  In: 3000 [I.V.:3000]  Out: 3900 [Urine:3900]    No results found for this or any previous visit (from the past 72 hour(s)). Assessment and Plan:    Principal Problem:    Dichorionic diamniotic twin pregnancy in second trimester (10/8/2019)      Overview: Pharmacist at [address] [place] [address]      - add Fe, 1mg folic acid. Already had flu shot prior to [facility].       - start LDA 81mg @ 12 weeks, stop at 35 weeks            10/29/19 office US, 12w6d, CL 4.9      13+ w, 19, VB, US today:  CERVIX- 4.8 CM WITHOUT FUNNELING      BICORNUATE UTERUS- PREG RT SIDE, LT ENDOMETRIUM- 18.3MM THICK      BABYA: +FHM, ANTERIOR PLACENTA- PREVIA RESOLVED, CYSTIC AREA VIAUALIZED       WITH LATERAL EDGE OF      PLACENTA APPEARS TO BE BLOOD VESSELS      BABY B: +FHM, POSTERIOR PLACENTA            11/15/2019 at [facility]:  Spontaneous Di/Di Twins, anterior and posterior placentas. Bicornuate uterus with pregnancy in right horn, septum extends       to mid uterus. Edge of anterior placenta covers internal os, too early to       call a Previa. CL 6.0 cm. Genetic counseling done by physician today. Genetic testing not recommended due to multiple gestation. 2019 at [facility]:  Normal early anatomy and echo x2. Very short CL 1.3       cm, with funneling and debris at internal os. today. Option of Cerclage       given, risks explained. Admitting tonight for Indocin and Abx and       scheduled for tomorrow            · Ad[facility] to [facility] · Follow up at [facility] in 1 week for CL. · Would take out of work and work from home if possible. · Nutritional optimization. Active Problems:    Short cervix during pregnancy in second trimester (2019)      Overview: 2019 at [facility]:             Patient noted to have cervical shortening and funneling on ultrasound, and       cervical effacement and softening on exam. I feel the patient has       developing cervical incompetence. She is at high risk of  labor,       PPROM and delivery. I discussed the option of cerclage placement at this       time versus expectant management with weekly cervical lengths. Risks of cerclage placement were given including bleeding, infection,       PPROM and loss of the pregnancy. Patient wants to have cerclage placed and       we will proceed with the procedure. Cerclage scheduled for 12/3/19  at       0930. Will do perioperative antibiotics and Indocin course. Pt to go to       hospital straight from office today. Will submit orders, H&P, and U/S       report. Consent signed and scanned to \"media\" tab in [facility]. · Recommend primary OB taking out of work for remainder of pregnancy; note       given. OB office to complete any STD/FMLA paperwork.             Uterus bicornis affecting pregnancy in second trimester (11/13/2019)      Overview: 11/15/2019 at [facility]:  Patient with Bicornuate Uterus with pregnancy in       right horn. See Di/Di Twin Overview      Cervical insufficiency during pregnancy in second trimester, antepartum (12/2/2019)            Patient Instructions:   Current Discharge Medication List      CONTINUE these medications which have NOT CHANGED    Details   ferrous sulfate (IRON) 325 mg (65 mg iron) tablet Take  by mouth Daily (before breakfast). cholecalciferol, vitamin D3, (VITAMIN D3) 2,000 unit tab Take  by mouth. aspirin delayed-release 81 mg tablet Take  by mouth daily. calcium carbonate (TUMS) 200 mg calcium (500 mg) chew Take 1 Tab by mouth daily. folic acid 291 mcg tablet Take 800 mcg by mouth daily. pyridoxine, vitamin B6, (VITAMIN B-6) 50 mg tablet Take  by mouth daily. doxylamine succinate (UNISOM) 25 mg tablet Take 25 mg by mouth nightly as needed for Sleep. PNV Combo No.47-Iron-FA #1-DHA 27 mg iron-1 mg -300 mg cap Take  by mouth. Follow-up in 1 week  See discharge instructions provided by nursing. Time: 30   Minutes spent on floor discharging patient, with greater than 50% of the time examining patient, explaining plan and coordinating discharge care with nurse and requesting primary physician.         Signed:  Franklin Jo MD  12/4/2019  10:20 AM

## 2019-12-04 NOTE — PROGRESS NOTES
Pt discharged from room. Discharge  teaching complete, pt verbalizes understanding; questions encouraged. Patient transported by wheelchair to private vehicle. Stable at discharge.

## 2019-12-04 NOTE — DISCHARGE INSTRUCTIONS
Patient Education        Cervical Cerclage to Prevent  Delivery: What to Expect at Home  Your Recovery  Cervical cerclage (say \"SER-vuh-kul ser-KLAZH\") is a procedure that helps keep your cervix from opening too soon. Your doctor has sewn your cervix shut to help prevent  labor. For the next few days, you may have:  · Cramping. · Spotting. · Pain when you urinate. Your doctor may give you instructions on when you can do your normal activities again, such as driving and going back to work. Your doctor will remove the stitches around your cervix at 37 weeks, or if you go into  labor or show signs of infection. This care sheet gives you a general idea about how long it will take for you to recover. But each person recovers at a different pace. Follow the steps below to get better as quickly as possible. How can you care for yourself at home? Activity    · Rest when you feel tired.     · Ask your doctor when it is okay for you to have sex. Medicines    · Be safe with medicines. Read and follow all instructions on the label.     · If you are not taking a prescription pain medicine, ask your doctor if you can take an over-the-counter medicine.     · Your doctor will tell you if and when you can restart your medicines. He or she will also give you instructions about taking any new medicines. Follow-up care is a key part of your treatment and safety. Be sure to make and go to all appointments, and call your doctor if you are having problems. It's also a good idea to know your test results and keep a list of the medicines you take. When should you call for help? Call 911 anytime you think you may need emergency care.  For example, call if:    · You have severe trouble breathing.     · You have sudden, severe pain in your belly.     · You have severe vaginal bleeding.    Call your doctor now or seek immediate medical care if:    · You have new pelvic pain, or the pain in your pelvis gets worse.     · You have a new discharge from your vagina.     · You have a new or higher fever.    Watch closely for changes in your health, and be sure to contact your doctor if you have any problems. Where can you learn more? Go to http://emiliano-tariq.info/. Enter V181 in the search box to learn more about \"Cervical Cerclage to Prevent  Delivery: What to Expect at Home. \"  Current as of: May 29, 2019  Content Version: 12.2  © 3781-0550 [employer]. Care instructions adapted under license by [employer] (which disclaims liability or warranty for this information). If you have questions about a medical condition or this instruction, always ask your healthcare professional. [address] any warranty or liability for your use of this information.

## 2019-12-04 NOTE — PROGRESS NOTES
Shift assessment completed per flow sheet. Pt denies complaints of LOF, vaginal bleeding, contractions, HA, epigastric pain, blurred vision or N/V. See flowsheet for details. POC reviewed with pt and pt verbalized understanding. Pt oriented to room and has call light within reach. Pt denies any needs at this time but will call out PRN. Pt now resting in bed with  at bedside.

## 2019-12-05 ENCOUNTER — PATIENT OUTREACH (OUTPATIENT)
Dept: OTHER | Age: 34
End: 2019-12-05

## 2019-12-05 NOTE — PROGRESS NOTES
Verified  and address for HIPAA security. Introduced [employer] for patient. Patient does not identify any Care Management needs at this time and declines services. Patient states she has follow up appointments made and doesn't need any assistance at this time.

## 2019-12-19 PROBLEM — O44.02 PLACENTA PREVIA IN SECOND TRIMESTER: Status: RESOLVED | Noted: 2019-10-29 | Resolved: 2019-12-19

## 2020-01-29 PROBLEM — O40.2XX0 POLYHYDRAMNIOS IN SECOND TRIMESTER: Status: ACTIVE | Noted: 2020-01-29

## 2020-02-12 ENCOUNTER — HOSPITAL ENCOUNTER (OUTPATIENT)
Dept: DIABETES SERVICES | Age: 35
Discharge: HOME | End: 2020-02-12
Payer: COMMERCIAL

## 2020-02-12 VITALS — BODY MASS INDEX: 23.1 KG/M2 | WEIGHT: 165 LBS | HEIGHT: 71 IN

## 2020-02-12 PROCEDURE — G0109 DIAB MANAGE TRN IND/GROUP: HCPCS

## 2020-02-12 NOTE — PROGRESS NOTES
Gestational Diabetes Self-Management Support Plan    Services Provided: Pt received education on nutrition and meal planning during pregnancy. Emotional support for adjustment to diagnosis was provided. Educated on additional calories needed for twins. Encouraged calories from protein and heart healthy fats. Care Plan/Recommendations:  Pt instructed to record blood sugar 4x/day and record all meals and snacks. Pt to bring information to appointments with [address] [place] [address] [address] [facility]. Pt was checking blood sugars from the end of the meal. Instructed to check 1 hr from the start of the meals. Notes for Follow Up:   1. Barriers to checking blood glucose and adherence to meal plan identified: none  2. Barriers to psychological adjustment to diagnosis identified: none  3. Patient needs to be seen by [facility] ASAP due to: appointment is 2/29/20. Medication reconciliation to be done by RN. Medications reviewed.     Nicole Browning, [address], LD, CDE  [facility] [address] [facility]

## 2020-02-19 PROBLEM — O30.043 DICHORIONIC DIAMNIOTIC TWIN PREGNANCY IN THIRD TRIMESTER: Status: ACTIVE | Noted: 2019-10-08

## 2020-02-19 PROBLEM — O24.410 DIET CONTROLLED GESTATIONAL DIABETES MELLITUS (GDM) IN THIRD TRIMESTER: Status: ACTIVE | Noted: 2020-02-19

## 2020-02-19 PROBLEM — O34.03 UTERUS BICORNIS AFFECTING PREGNANCY IN THIRD TRIMESTER: Status: ACTIVE | Noted: 2019-11-13

## 2020-02-19 PROBLEM — O26.873: Status: ACTIVE | Noted: 2019-12-02

## 2020-02-19 PROBLEM — O40.3XX0 POLYHYDRAMNIOS IN THIRD TRIMESTER: Status: ACTIVE | Noted: 2020-01-29

## 2020-04-27 ENCOUNTER — ANESTHESIA EVENT (OUTPATIENT)
Dept: LABOR AND DELIVERY | Age: 35
End: 2020-04-27
Payer: COMMERCIAL

## 2020-04-28 ENCOUNTER — HOSPITAL ENCOUNTER (INPATIENT)
Age: 35
LOS: 3 days | Discharge: HOME | End: 2020-05-01
Attending: OBSTETRICS & GYNECOLOGY | Admitting: OBSTETRICS & GYNECOLOGY
Payer: COMMERCIAL

## 2020-04-28 ENCOUNTER — ANESTHESIA (OUTPATIENT)
Dept: LABOR AND DELIVERY | Age: 35
End: 2020-04-28
Payer: COMMERCIAL

## 2020-04-28 VITALS — SYSTOLIC BLOOD PRESSURE: 125 MMHG | DIASTOLIC BLOOD PRESSURE: 71 MMHG | OXYGEN SATURATION: 99 % | HEART RATE: 61 BPM

## 2020-04-28 DIAGNOSIS — Z98.891 STATUS POST CESAREAN SECTION: Primary | ICD-10-CM

## 2020-04-28 PROBLEM — O30.003 TWIN GESTATION IN THIRD TRIMESTER: Status: ACTIVE | Noted: 2020-04-28

## 2020-04-28 PROBLEM — Z3A.38 38 WEEKS GESTATION OF PREGNANCY: Status: ACTIVE | Noted: 2020-04-28

## 2020-04-28 LAB
ABO + RH BLD: NORMAL
ALBUMIN SERPL-MCNC: 2.5 G/DL (ref 3.5–5)
ALBUMIN/GLOB SERPL: 0.5 {RATIO} (ref 1.2–3.5)
ALP SERPL-CCNC: 207 U/L (ref 50–130)
ALT SERPL-CCNC: 17 U/L (ref 12–65)
ANION GAP SERPL CALC-SCNC: 10 MMOL/L (ref 7–16)
ARTERIAL PATENCY WRIST A: ABNORMAL
AST SERPL-CCNC: 27 U/L (ref 15–37)
BASE DEFICIT BLD-SCNC: 3 MMOL/L
BASE DEFICIT BLD-SCNC: 3 MMOL/L
BASE DEFICIT BLD-SCNC: 5 MMOL/L
BASE DEFICIT BLD-SCNC: 7 MMOL/L
BASOPHILS # BLD: 0 K/UL (ref 0–0.2)
BASOPHILS NFR BLD: 0 % (ref 0–2)
BDY SITE: ABNORMAL
BILIRUB SERPL-MCNC: 0.4 MG/DL (ref 0.2–1.1)
BLOOD GROUP ANTIBODIES SERPL: NORMAL
BUN SERPL-MCNC: 11 MG/DL (ref 6–23)
CA-I BLD-MCNC: 1.54 MMOL/L (ref 1.12–1.32)
CA-I BLD-MCNC: 1.55 MMOL/L (ref 1.12–1.32)
CA-I BLD-MCNC: 1.56 MMOL/L (ref 1.12–1.32)
CA-I BLD-MCNC: 1.57 MMOL/L (ref 1.12–1.32)
CALCIUM SERPL-MCNC: 9.2 MG/DL (ref 8.3–10.4)
CHLORIDE SERPL-SCNC: 108 MMOL/L (ref 98–107)
CO2 SERPL-SCNC: 21 MMOL/L (ref 21–32)
COLLECT TIME,HTIME: 1028
COLLECT TIME,HTIME: 1028
COLLECT TIME,HTIME: 1031
COLLECT TIME,HTIME: 1031
CREAT SERPL-MCNC: 0.88 MG/DL (ref 0.6–1)
DIFFERENTIAL METHOD BLD: ABNORMAL
EOSINOPHIL # BLD: 0.1 K/UL (ref 0–0.8)
EOSINOPHIL NFR BLD: 1 % (ref 0.5–7.8)
ERYTHROCYTE [DISTWIDTH] IN BLOOD BY AUTOMATED COUNT: 13 % (ref 11.9–14.6)
GAS FLOW.O2 O2 DELIVERY SYS: ABNORMAL L/MIN
GLOBULIN SER CALC-MCNC: 4.7 G/DL (ref 2.3–3.5)
GLUCOSE BLD STRIP.AUTO-MCNC: 49 MG/DL (ref 65–100)
GLUCOSE BLD STRIP.AUTO-MCNC: 53 MG/DL (ref 65–100)
GLUCOSE BLD STRIP.AUTO-MCNC: 61 MG/DL (ref 65–100)
GLUCOSE BLD STRIP.AUTO-MCNC: 63 MG/DL (ref 65–100)
GLUCOSE SERPL-MCNC: 77 MG/DL (ref 65–100)
HCO3 BLD-SCNC: 21.5 MMOL/L (ref 22–26)
HCO3 BLD-SCNC: 21.8 MMOL/L (ref 22–26)
HCO3 BLD-SCNC: 21.9 MMOL/L (ref 22–26)
HCO3 BLD-SCNC: 23.8 MMOL/L (ref 22–26)
HCT VFR BLD AUTO: 44 % (ref 35.8–46.3)
HGB BLD-MCNC: 14.9 G/DL (ref 11.7–15.4)
IMM GRANULOCYTES # BLD AUTO: 0 K/UL (ref 0–0.5)
IMM GRANULOCYTES NFR BLD AUTO: 0 % (ref 0–5)
LYMPHOCYTES # BLD: 1.8 K/UL (ref 0.5–4.6)
LYMPHOCYTES NFR BLD: 22 % (ref 13–44)
MCH RBC QN AUTO: 32.5 PG (ref 26.1–32.9)
MCHC RBC AUTO-ENTMCNC: 33.9 G/DL (ref 31.4–35)
MCV RBC AUTO: 96.1 FL (ref 79.6–97.8)
MONOCYTES # BLD: 0.5 K/UL (ref 0.1–1.3)
MONOCYTES NFR BLD: 6 % (ref 4–12)
NEUTS SEG # BLD: 5.9 K/UL (ref 1.7–8.2)
NEUTS SEG NFR BLD: 71 % (ref 43–78)
NRBC # BLD: 0 K/UL (ref 0–0.2)
PCO2 BLD: 38.5 MMHG (ref 35–45)
PCO2 BLD: 44 MMHG (ref 35–45)
PCO2 BLD: 47 MMHG (ref 35–45)
PCO2 BLD: 53.5 MMHG (ref 35–45)
PH BLD: 7.21 [PH] (ref 7.35–7.45)
PH BLD: 7.3 [PH] (ref 7.35–7.45)
PH BLD: 7.31 [PH] (ref 7.35–7.45)
PH BLD: 7.36 [PH] (ref 7.35–7.45)
PLATELET # BLD AUTO: 131 K/UL (ref 150–450)
PMV BLD AUTO: 12.8 FL (ref 9.4–12.3)
PO2 BLD: 15 MMHG (ref 75–100)
PO2 BLD: 15 MMHG (ref 75–100)
PO2 BLD: 21 MMHG (ref 75–100)
PO2 BLD: 23 MMHG (ref 75–100)
POTASSIUM BLD-SCNC: 4.7 MMOL/L (ref 3.5–5.1)
POTASSIUM BLD-SCNC: 4.7 MMOL/L (ref 3.5–5.1)
POTASSIUM BLD-SCNC: 4.8 MMOL/L (ref 3.5–5.1)
POTASSIUM BLD-SCNC: 5 MMOL/L (ref 3.5–5.1)
POTASSIUM SERPL-SCNC: 3.4 MMOL/L (ref 3.5–5.1)
PROT SERPL-MCNC: 7.2 G/DL (ref 6.3–8.2)
RBC # BLD AUTO: 4.58 M/UL (ref 4.05–5.2)
SAO2 % BLD: 14 % (ref 95–98)
SAO2 % BLD: 17 % (ref 95–98)
SAO2 % BLD: 30 % (ref 95–98)
SAO2 % BLD: 37 % (ref 95–98)
SERVICE CMNT-IMP: ABNORMAL
SODIUM BLD-SCNC: 138 MMOL/L (ref 136–145)
SODIUM BLD-SCNC: 139 MMOL/L (ref 136–145)
SODIUM BLD-SCNC: 139 MMOL/L (ref 136–145)
SODIUM BLD-SCNC: 140 MMOL/L (ref 136–145)
SODIUM SERPL-SCNC: 139 MMOL/L (ref 136–145)
SPECIMEN EXP DATE BLD: NORMAL
SPECIMEN TYPE: ABNORMAL
WBC # BLD AUTO: 8.3 K/UL (ref 4.3–11.1)

## 2020-04-28 PROCEDURE — 74011000250 HC RX REV CODE- 250: Performed by: ANESTHESIOLOGY

## 2020-04-28 PROCEDURE — 76010000392 HC C SECN EA ADDL 0.5 HR: Performed by: OBSTETRICS & GYNECOLOGY

## 2020-04-28 PROCEDURE — 82803 BLOOD GASES ANY COMBINATION: CPT

## 2020-04-28 PROCEDURE — 59025 FETAL NON-STRESS TEST: CPT

## 2020-04-28 PROCEDURE — 77030032490 HC SLV COMPR SCD KNE COVD -B: Performed by: OBSTETRICS & GYNECOLOGY

## 2020-04-28 PROCEDURE — 74011000250 HC RX REV CODE- 250: Performed by: NURSE ANESTHETIST, CERTIFIED REGISTERED

## 2020-04-28 PROCEDURE — 74011250636 HC RX REV CODE- 250/636: Performed by: OBSTETRICS & GYNECOLOGY

## 2020-04-28 PROCEDURE — 77030018846 HC SOL IRR STRL H20 ICUM -A: Performed by: OBSTETRICS & GYNECOLOGY

## 2020-04-28 PROCEDURE — 77030003665 HC NDL SPN BBMI -A: Performed by: ANESTHESIOLOGY

## 2020-04-28 PROCEDURE — 4A1HXCZ MONITORING OF PRODUCTS OF CONCEPTION, CARDIAC RATE, EXTERNAL APPROACH: ICD-10-PCS | Performed by: OBSTETRICS & GYNECOLOGY

## 2020-04-28 PROCEDURE — 74011250636 HC RX REV CODE- 250/636: Performed by: ANESTHESIOLOGY

## 2020-04-28 PROCEDURE — 76060000078 HC EPIDURAL ANESTHESIA: Performed by: OBSTETRICS & GYNECOLOGY

## 2020-04-28 PROCEDURE — 76010000391 HC C SECN FIRST 1 HR: Performed by: OBSTETRICS & GYNECOLOGY

## 2020-04-28 PROCEDURE — 74011250636 HC RX REV CODE- 250/636: Performed by: NURSE ANESTHETIST, CERTIFIED REGISTERED

## 2020-04-28 PROCEDURE — 77030034696 HC CATH URETH FOL 2W BARD -A: Performed by: OBSTETRICS & GYNECOLOGY

## 2020-04-28 PROCEDURE — 80053 COMPREHEN METABOLIC PANEL: CPT

## 2020-04-28 PROCEDURE — 82947 ASSAY GLUCOSE BLOOD QUANT: CPT

## 2020-04-28 PROCEDURE — 77030002888 HC SUT CHRMC J&J -A: Performed by: OBSTETRICS & GYNECOLOGY

## 2020-04-28 PROCEDURE — 65270000029 HC RM PRIVATE

## 2020-04-28 PROCEDURE — 85025 COMPLETE CBC W/AUTO DIFF WBC: CPT

## 2020-04-28 PROCEDURE — 75410000003 HC RECOV DEL/VAG/CSECN EA 0.5 HR: Performed by: OBSTETRICS & GYNECOLOGY

## 2020-04-28 PROCEDURE — 77030002974 HC SUT PLN J&J -A: Performed by: OBSTETRICS & GYNECOLOGY

## 2020-04-28 PROCEDURE — 86900 BLOOD TYPING SEROLOGIC ABO: CPT

## 2020-04-28 PROCEDURE — 77030002933 HC SUT MCRYL J&J -A: Performed by: OBSTETRICS & GYNECOLOGY

## 2020-04-28 PROCEDURE — 74011250637 HC RX REV CODE- 250/637: Performed by: ANESTHESIOLOGY

## 2020-04-28 PROCEDURE — 77030007880 HC KT SPN EPDRL BBMI -B: Performed by: ANESTHESIOLOGY

## 2020-04-28 PROCEDURE — 74011000250 HC RX REV CODE- 250: Performed by: OBSTETRICS & GYNECOLOGY

## 2020-04-28 PROCEDURE — 77030018836 HC SOL IRR NACL ICUM -A: Performed by: OBSTETRICS & GYNECOLOGY

## 2020-04-28 PROCEDURE — 0UCC7ZZ EXTIRPATION OF MATTER FROM CERVIX, VIA NATURAL OR ARTIFICIAL OPENING: ICD-10-PCS | Performed by: OBSTETRICS & GYNECOLOGY

## 2020-04-28 RX ORDER — DEXTROSE, SODIUM CHLORIDE, SODIUM LACTATE, POTASSIUM CHLORIDE, AND CALCIUM CHLORIDE 5; .6; .31; .03; .02 G/100ML; G/100ML; G/100ML; G/100ML; G/100ML
125 INJECTION, SOLUTION INTRAVENOUS CONTINUOUS
Status: DISCONTINUED | OUTPATIENT
Start: 2020-04-28 | End: 2020-04-28 | Stop reason: HOSPADM

## 2020-04-28 RX ORDER — SODIUM CHLORIDE, SODIUM LACTATE, POTASSIUM CHLORIDE, CALCIUM CHLORIDE 600; 310; 30; 20 MG/100ML; MG/100ML; MG/100ML; MG/100ML
100 INJECTION, SOLUTION INTRAVENOUS CONTINUOUS
Status: DISCONTINUED | OUTPATIENT
Start: 2020-04-28 | End: 2020-04-28 | Stop reason: HOSPADM

## 2020-04-28 RX ORDER — KETOROLAC TROMETHAMINE 30 MG/ML
30 INJECTION, SOLUTION INTRAMUSCULAR; INTRAVENOUS
Status: DISCONTINUED | OUTPATIENT
Start: 2020-04-28 | End: 2020-04-29

## 2020-04-28 RX ORDER — MORPHINE SULFATE 0.5 MG/ML
INJECTION, SOLUTION EPIDURAL; INTRATHECAL; INTRAVENOUS
Status: COMPLETED | OUTPATIENT
Start: 2020-04-28 | End: 2020-04-28

## 2020-04-28 RX ORDER — NALOXONE HYDROCHLORIDE 0.4 MG/ML
0.2 INJECTION, SOLUTION INTRAMUSCULAR; INTRAVENOUS; SUBCUTANEOUS
Status: DISCONTINUED | OUTPATIENT
Start: 2020-04-28 | End: 2020-04-29

## 2020-04-28 RX ORDER — ONDANSETRON 2 MG/ML
INJECTION INTRAMUSCULAR; INTRAVENOUS AS NEEDED
Status: DISCONTINUED | OUTPATIENT
Start: 2020-04-28 | End: 2020-04-28 | Stop reason: HOSPADM

## 2020-04-28 RX ORDER — BUPIVACAINE HYDROCHLORIDE 7.5 MG/ML
INJECTION, SOLUTION INTRASPINAL
Status: COMPLETED | OUTPATIENT
Start: 2020-04-28 | End: 2020-04-28

## 2020-04-28 RX ORDER — OXYCODONE HYDROCHLORIDE 5 MG/1
10 TABLET ORAL
Status: DISCONTINUED | OUTPATIENT
Start: 2020-04-28 | End: 2020-04-29

## 2020-04-28 RX ORDER — KETOROLAC TROMETHAMINE 30 MG/ML
INJECTION, SOLUTION INTRAMUSCULAR; INTRAVENOUS AS NEEDED
Status: DISCONTINUED | OUTPATIENT
Start: 2020-04-28 | End: 2020-04-28 | Stop reason: HOSPADM

## 2020-04-28 RX ORDER — SODIUM CHLORIDE 0.9 % (FLUSH) 0.9 %
5-40 SYRINGE (ML) INJECTION EVERY 8 HOURS
Status: DISCONTINUED | OUTPATIENT
Start: 2020-04-28 | End: 2020-04-28 | Stop reason: HOSPADM

## 2020-04-28 RX ORDER — OXYTOCIN/RINGER'S LACTATE 30/500 ML
500 PLASTIC BAG, INJECTION (ML) INTRAVENOUS
Status: DISCONTINUED | OUTPATIENT
Start: 2020-04-28 | End: 2020-04-29

## 2020-04-28 RX ORDER — ACETAMINOPHEN 325 MG/1
650 TABLET ORAL EVERY 6 HOURS
Status: COMPLETED | OUTPATIENT
Start: 2020-04-28 | End: 2020-04-29

## 2020-04-28 RX ORDER — SODIUM CHLORIDE 0.9 % (FLUSH) 0.9 %
5-40 SYRINGE (ML) INJECTION AS NEEDED
Status: DISCONTINUED | OUTPATIENT
Start: 2020-04-28 | End: 2020-04-28 | Stop reason: HOSPADM

## 2020-04-28 RX ORDER — MORPHINE SULFATE 0.5 MG/ML
INJECTION, SOLUTION EPIDURAL; INTRATHECAL; INTRAVENOUS AS NEEDED
Status: DISCONTINUED | OUTPATIENT
Start: 2020-04-28 | End: 2020-04-28 | Stop reason: HOSPADM

## 2020-04-28 RX ORDER — ONDANSETRON 2 MG/ML
4 INJECTION INTRAMUSCULAR; INTRAVENOUS
Status: DISCONTINUED | OUTPATIENT
Start: 2020-04-28 | End: 2020-04-29

## 2020-04-28 RX ORDER — HYDROMORPHONE HYDROCHLORIDE 1 MG/ML
0.5 INJECTION, SOLUTION INTRAMUSCULAR; INTRAVENOUS; SUBCUTANEOUS
Status: DISCONTINUED | OUTPATIENT
Start: 2020-04-28 | End: 2020-04-29

## 2020-04-28 RX ORDER — SODIUM CHLORIDE, SODIUM LACTATE, POTASSIUM CHLORIDE, CALCIUM CHLORIDE 600; 310; 30; 20 MG/100ML; MG/100ML; MG/100ML; MG/100ML
INJECTION, SOLUTION INTRAVENOUS
Status: DISCONTINUED | OUTPATIENT
Start: 2020-04-28 | End: 2020-04-28 | Stop reason: HOSPADM

## 2020-04-28 RX ORDER — OXYTOCIN/RINGER'S LACTATE 30/500 ML
250 PLASTIC BAG, INJECTION (ML) INTRAVENOUS ONCE
Status: COMPLETED | OUTPATIENT
Start: 2020-04-28 | End: 2020-04-28

## 2020-04-28 RX ORDER — NALBUPHINE HYDROCHLORIDE 20 MG/ML
5 INJECTION, SOLUTION INTRAMUSCULAR; INTRAVENOUS; SUBCUTANEOUS
Status: DISCONTINUED | OUTPATIENT
Start: 2020-04-28 | End: 2020-04-28 | Stop reason: RX

## 2020-04-28 RX ORDER — CEFAZOLIN SODIUM/WATER 2 G/20 ML
2 SYRINGE (ML) INTRAVENOUS ONCE
Status: COMPLETED | OUTPATIENT
Start: 2020-04-28 | End: 2020-04-28

## 2020-04-28 RX ORDER — SODIUM CHLORIDE, SODIUM LACTATE, POTASSIUM CHLORIDE, CALCIUM CHLORIDE 600; 310; 30; 20 MG/100ML; MG/100ML; MG/100ML; MG/100ML
50 INJECTION, SOLUTION INTRAVENOUS CONTINUOUS
Status: DISCONTINUED | OUTPATIENT
Start: 2020-04-28 | End: 2020-04-29

## 2020-04-28 RX ORDER — TRISODIUM CITRATE DIHYDRATE AND CITRIC ACID MONOHYDRATE 500; 334 MG/5ML; MG/5ML
30 SOLUTION ORAL ONCE
Status: COMPLETED | OUTPATIENT
Start: 2020-04-28 | End: 2020-04-28

## 2020-04-28 RX ADMIN — BUPIVACAINE HYDROCHLORIDE IN DEXTROSE 12 MG: 7.5 INJECTION, SOLUTION SUBARACHNOID at 10:00

## 2020-04-28 RX ADMIN — MORPHINE SULFATE 4.85 MG: 0.5 INJECTION, SOLUTION EPIDURAL; INTRATHECAL; INTRAVENOUS at 11:15

## 2020-04-28 RX ADMIN — Medication 500 ML/HR: at 10:32

## 2020-04-28 RX ADMIN — ONDANSETRON 4 MG: 2 INJECTION INTRAMUSCULAR; INTRAVENOUS at 10:37

## 2020-04-28 RX ADMIN — MORPHINE SULFATE 0.15 MG: 0.5 INJECTION, SOLUTION EPIDURAL; INTRATHECAL; INTRAVENOUS at 10:00

## 2020-04-28 RX ADMIN — KETOROLAC TROMETHAMINE 30 MG: 30 INJECTION, SOLUTION INTRAMUSCULAR; INTRAVENOUS at 11:07

## 2020-04-28 RX ADMIN — SODIUM CHLORIDE, SODIUM LACTATE, POTASSIUM CHLORIDE, AND CALCIUM CHLORIDE 100 ML/HR: 600; 310; 30; 20 INJECTION, SOLUTION INTRAVENOUS at 09:11

## 2020-04-28 RX ADMIN — ACETAMINOPHEN 650 MG: 325 TABLET, FILM COATED ORAL at 13:50

## 2020-04-28 RX ADMIN — SODIUM CHLORIDE, SODIUM LACTATE, POTASSIUM CHLORIDE, AND CALCIUM CHLORIDE: 600; 310; 30; 20 INJECTION, SOLUTION INTRAVENOUS at 09:52

## 2020-04-28 RX ADMIN — PHENYLEPHRINE HYDROCHLORIDE 100 MCG: 10 INJECTION INTRAVENOUS at 10:10

## 2020-04-28 RX ADMIN — KETOROLAC TROMETHAMINE 30 MG: 30 INJECTION, SOLUTION INTRAMUSCULAR at 16:52

## 2020-04-28 RX ADMIN — SODIUM CHLORIDE, SODIUM LACTATE, POTASSIUM CHLORIDE, AND CALCIUM CHLORIDE: 600; 310; 30; 20 INJECTION, SOLUTION INTRAVENOUS at 10:35

## 2020-04-28 RX ADMIN — MORPHINE SULFATE 2 MCG: 0.5 INJECTION, SOLUTION EPIDURAL; INTRATHECAL; INTRAVENOUS at 11:05

## 2020-04-28 RX ADMIN — PHENYLEPHRINE HYDROCHLORIDE 200 MCG: 10 INJECTION INTRAVENOUS at 10:05

## 2020-04-28 RX ADMIN — Medication 250 ML/HR: at 12:30

## 2020-04-28 RX ADMIN — SODIUM CHLORIDE, SODIUM LACTATE, POTASSIUM CHLORIDE, AND CALCIUM CHLORIDE 100 ML/HR: 600; 310; 30; 20 INJECTION, SOLUTION INTRAVENOUS at 07:31

## 2020-04-28 RX ADMIN — WATER 2 G: 1000 INJECTION, SOLUTION INTRAVENOUS at 09:12

## 2020-04-28 RX ADMIN — SODIUM CHLORIDE, SODIUM LACTATE, POTASSIUM CHLORIDE, AND CALCIUM CHLORIDE: 600; 310; 30; 20 INJECTION, SOLUTION INTRAVENOUS at 11:11

## 2020-04-28 RX ADMIN — SODIUM CHLORIDE, SODIUM LACTATE, POTASSIUM CHLORIDE, AND CALCIUM CHLORIDE 50 ML/HR: 600; 310; 30; 20 INJECTION, SOLUTION INTRAVENOUS at 12:10

## 2020-04-28 RX ADMIN — KETOROLAC TROMETHAMINE 30 MG: 30 INJECTION, SOLUTION INTRAMUSCULAR at 22:34

## 2020-04-28 RX ADMIN — SODIUM CITRATE AND CITRIC ACID MONOHYDRATE 30 ML: 500; 334 SOLUTION ORAL at 08:24

## 2020-04-28 RX ADMIN — FAMOTIDINE 20 MG: 10 INJECTION INTRAVENOUS at 08:24

## 2020-04-28 RX ADMIN — ONDANSETRON 4 MG: 2 INJECTION INTRAMUSCULAR; INTRAVENOUS at 10:52

## 2020-04-28 RX ADMIN — ACETAMINOPHEN 650 MG: 325 TABLET, FILM COATED ORAL at 19:59

## 2020-04-28 NOTE — LACTATION NOTE
This note was copied from a baby's chart. RN assists with feeding infant with 1 mL pumped colostrum with syringe.

## 2020-04-28 NOTE — PROGRESS NOTES
Pt presented for scheduled C/Section. Pt states she had a phone interview with [address] [place] [address] RN and the information that was obtained has not changed. POC reviewed. IV started, Consents witnessed. Lab work drawn, sent to lab.

## 2020-04-28 NOTE — PROGRESS NOTES
Latest temperature would not read orally. Axillary temp 96.5. Bear hugger warmer still in place. Warm water provided. Bear hugger warmer brought up to shoulders. Admission assessment complete as noted. Patient oriented to room and unit. Plan of care reviewed and patient verbalizes understanding. Questions encouraged and answered. Patent encouraged to call for needs or concerns. Safety Teaching reviewed:   1. Hand hygiene prior to handling the infant. 2. Use of bulb syringe. 3. Bracelets with matching numbers are placed on mother and infant  4. An infant security tag  [facility]) is placed on the infant's ankle and monitored  5. All OB nurses wear pink Employee badges - do not give your baby to anyone without proper identification. 6. Never leave the baby alone in the room. 7. The infant should be placed on their back to sleep. on a firm mattress. No toys should be placed in the crib. (safe sleep video offered to view)  8. Never shake the baby (video offered to view)  9. Infant fall prevention - do not sleep with the baby, and place the baby in the crib while ambulating. 8. Mother and Baby Care booklet given to Mother.

## 2020-04-28 NOTE — ANESTHESIA PROCEDURE NOTES
Spinal Block    Start time: 4/28/2020 9:57 AM  End time: 4/28/2020 10:01 AM  Performed by: Lita Montejo MD  Authorized by: Lita Montejo MD     Pre-procedure:   Indications: at surgeon's request and primary anesthetic  Preanesthetic Checklist: patient identified, risks and benefits discussed, anesthesia consent, site marked, patient being monitored and timeout performed    Timeout Time: 09:57          Spinal Block:   Patient Position:  Seated  Prep Region:  Lumbar  Prep: chlorhexidine      Location:  L3-4  Technique:  Single shot        Needle:   Needle Type:  Pencan  Needle Gauge:  25 G  Attempts:  1      Events: CSF confirmed, no blood with aspiration and no paresthesia        Assessment:  Insertion:  Uncomplicated  Patient tolerance:  Patient tolerated the procedure well with no immediate complications

## 2020-04-28 NOTE — PROGRESS NOTES
Nutrition: Best Practice Alerts received for GDM and multigestation. Patient is admitted for planned C section. Will defer assessment as per standard of care.  David Mason, [address]

## 2020-04-28 NOTE — H&P
History & Physical    Name: Elsa Cisse MRN: [id number]  SSN: [id number]    YOB: 1985  Age: 30 y.o. Sex: female      Subjective:     Estimated Date of Delivery: 20  OB History    Para Term  AB Living   1 0 0 0 0 0   SAB TAB Ectopic Molar Multiple Live Births   0 0 0 0 0 0      # Outcome Date GA Lbr Prem/2nd Weight Sex Delivery Anes PTL Lv   1 Current                Ms. David Wynne admitted with pregnancy at 38w1d for  section due to multiple gestation and cerclage and baby A breech. Prenatal course was complicated by cervical incompetence, diabetes - gestational and twins. Please see prenatal records for details. Past Medical History:   Diagnosis Date    Diet controlled gestational diabetes mellitus (GDM) in third trimester 2020    Seasonal allergic rhinitis 2018     Past Surgical History:   Procedure Laterality Date    HX OTHER SURGICAL      intussusception as a baby    HX WISDOM TEETH EXTRACTION       Social History     Occupational History    Not on file   Tobacco Use    Smoking status: Never Smoker    Smokeless tobacco: Never Used   Substance and Sexual Activity    Alcohol use: Not Currently    Drug use: No    Sexual activity: Yes     Partners: Male     Birth control/protection: None     Family History   Problem Relation Age of Onset    Depression Mother     Other Father         paroxysmal afib    Depression Brother         29year old     No Known Problems Brother     No Known Problems Brother     Breast Cancer Neg Hx     Ovarian Cancer Neg Hx     Colon Cancer Neg Hx     Diabetes Neg Hx     Hypertension Neg Hx        No Known Allergies  Prior to Admission medications    Medication Sig Start Date End Date Taking? Authorizing Provider   AMBULATORY BREAST PUMP Use as directed 3/3/20   Mars Tam MD   polyethylene glycol (MIRALAX) 17 gram/dose powder Take 17 g by mouth daily.     Provider, Historical   famotidine (PEPCID) 20 mg tablet Take 1 Tab by mouth every twelve (12) hours. Indications: gastroesophageal reflux disease 19   Adore Campbell MD   ferrous sulfate (IRON) 325 mg (65 mg iron) tablet Take  by mouth Daily (before breakfast). Provider, Historical   cholecalciferol, vitamin D3, (VITAMIN D3) 2,000 unit tab Take  by mouth. Provider, Historical   calcium carbonate (TUMS) 200 mg calcium (500 mg) chew Take 1 Tab by mouth daily. Patient taking as needed    Provider, Historical   folic acid 278 mcg tablet Take 800 mcg by mouth daily. Provider, Historical   pyridoxine, vitamin B6, (VITAMIN B-6) 50 mg tablet Take  by mouth daily. Provider, Historical   doxylamine succinate (UNISOM) 25 mg tablet Take 25 mg by mouth nightly as needed for Sleep. Patient reports taking daily    Provider, Historical   PNV Combo No.47-Iron-FA #1-DHA 27 mg iron-1 mg -300 mg cap Take  by mouth. Provider, Historical        Review of Systems: A comprehensive review of systems was negative except for that written in the History of Present Illness. Objective:     Vitals:  Vitals:    20 0740   BP: 109/74   Pulse: 81   Resp: 18   Temp: 98.7 °F (37.1 °C)        Physical Exam:  Patient without distress. Heart: Regular rate and rhythm  Lung: clear to auscultation throughout lung fields, no wheezes, no rales, no rhonchi and normal respiratory effort  Membranes:  Intact  Fetal Heart Rate: Reactive    Prenatal Labs:   Lab Results   Component Value Date/Time    ABO/Rh(D) A POSITIVE 2020 07:34 AM         Impression/Plan:     Plan:  Admit for  section. Group B Strep was negative. Discussed the risks of surgery including the risks of bleeding, infection, deep vein thrombosis, and surgical injuries to internal organs including but not limited to the bowels, bladder, rectum, and female reproductive organs. The patient understands the risks; any and all questions were answered to the patient's satisfaction.

## 2020-04-28 NOTE — ANESTHESIA POSTPROCEDURE EVALUATION
Procedure(s):   SECTION.     spinal    Anesthesia Post Evaluation      Multimodal analgesia: multimodal analgesia used between 6 hours prior to anesthesia start to PACU discharge  Patient location during evaluation: PACU  Patient participation: complete - patient participated  Level of consciousness: awake and alert  Pain management: adequate  Airway patency: patent  Anesthetic complications: no  Cardiovascular status: acceptable  Respiratory status: acceptable  Hydration status: acceptable  Post anesthesia nausea and vomiting:  controlled      Visit Vitals  /75 (BP 1 Location: Right arm, BP Patient Position: At rest)   Pulse 68   Temp 36.8 °C (98.2 °F)   Resp 18   SpO2 97%   Breastfeeding Yes

## 2020-04-28 NOTE — LACTATION NOTE
Mom reports baby A latched after delivery. Baby B attempted. Already pumping. Baby B stirring at lactation visit. Assisted with attempt at breast in football on R with Baby B. No latch. Baby is doing some tongue thrusting. Mom has an oblong flat nipple on R and a short small nipple on L. Also appears to have some hyperkeratosis. Skin on R nipple is coming off in small balls. Assisted with breastfeeding in football and laid back on L with Baby A.  Baby fed fairly well. Mom needs some assistance with gathering breast tissue to fit in baby's mouth. Mom pumped after using a 27 mm flange on R and 24 mm flange on L. Per RN mom got 1 ml from L and syringe fed to Baby B. Discussed normal  behavior. May take baby a little while to figure out how to nurse well. Plan to continued trying at breast and pumping if no latch, or only A latches. Will follow output and weight loss. Will continue to assist with positioning and technique. Encouraged attempt at breast and follow plan. Discussed first 24 hours and strategies with twins.

## 2020-04-28 NOTE — LACTATION NOTE

## 2020-04-28 NOTE — BRIEF OP NOTE
Brief Postoperative Note    Patient: Rabia Serrano  YOB: 1985  MRN: [id number]    Date of Procedure: 2020     Pre-Op Diagnosis: camilla 2020 Primary  Section TWINS BREECH- cerclage removal    Post-Op Diagnosis: Same as preoperative diagnosis.       Procedure(s):   SECTION    Surgeon(s):  Virginia Cueva MD    Surgical Assistant: None    Anesthesia: Spinal     Estimated Blood Loss (mL): 727    Complications: None    Specimens:   ID Type Source Tests Collected by Time Destination   1 : placenta Placenta Placenta  Virginia Cueva MD 2020 1043 Discarded   2 : placenta Placenta Placenta  Virginia Cueva MD 2020 1043 Discarded        Implants: * No implants in log *    Drains: * No LDAs found *    Findings: viable twin male infants    Electronically Signed by Jer Celeste MD on 2020 at 11:12 AM

## 2020-04-28 NOTE — ANESTHESIA PREPROCEDURE EVALUATION
Relevant Problems   No relevant active problems       Anesthetic History   No history of anesthetic complications            Review of Systems / Medical History  Patient summary reviewed and pertinent labs reviewed    Pulmonary  Within defined limits                 Neuro/Psych   Within defined limits           Cardiovascular  Within defined limits                     GI/Hepatic/Renal     GERD           Endo/Other    Diabetes (diet controlled)         Other Findings   Comments: Hb 14.9  Plts 131    Primary  and cerclage removal secondary to breech twins            Physical Exam    Airway  Mallampati: III  TM Distance: 4 - 6 cm  Neck ROM: normal range of motion   Mouth opening: Normal     Cardiovascular    Rhythm: regular  Rate: normal         Dental  No notable dental hx       Pulmonary  Breath sounds clear to auscultation               Abdominal         Other Findings            Anesthetic Plan    ASA: 2  Anesthesia type: spinal            Anesthetic plan and risks discussed with: Patient

## 2020-04-29 LAB
HCT VFR BLD AUTO: 31.7 % (ref 35.8–46.3)
HGB BLD-MCNC: 10.8 G/DL (ref 11.7–15.4)

## 2020-04-29 PROCEDURE — 74011250637 HC RX REV CODE- 250/637: Performed by: ANESTHESIOLOGY

## 2020-04-29 PROCEDURE — 85018 HEMOGLOBIN: CPT

## 2020-04-29 PROCEDURE — 74011250637 HC RX REV CODE- 250/637: Performed by: OBSTETRICS & GYNECOLOGY

## 2020-04-29 PROCEDURE — 65270000029 HC RM PRIVATE

## 2020-04-29 PROCEDURE — 74011250636 HC RX REV CODE- 250/636: Performed by: ANESTHESIOLOGY

## 2020-04-29 PROCEDURE — 36415 COLL VENOUS BLD VENIPUNCTURE: CPT

## 2020-04-29 RX ORDER — DOCUSATE SODIUM 100 MG/1
100 CAPSULE, LIQUID FILLED ORAL 2 TIMES DAILY
Status: DISCONTINUED | OUTPATIENT
Start: 2020-04-29 | End: 2020-05-01 | Stop reason: HOSPADM

## 2020-04-29 RX ORDER — ACETAMINOPHEN 500 MG
1000 TABLET ORAL
Status: DISCONTINUED | OUTPATIENT
Start: 2020-04-29 | End: 2020-05-01 | Stop reason: HOSPADM

## 2020-04-29 RX ORDER — OXYCODONE AND ACETAMINOPHEN 10; 325 MG/1; MG/1
1 TABLET ORAL
Status: DISCONTINUED | OUTPATIENT
Start: 2020-04-29 | End: 2020-05-01 | Stop reason: HOSPADM

## 2020-04-29 RX ORDER — SODIUM CHLORIDE 0.9 % (FLUSH) 0.9 %
5-40 SYRINGE (ML) INJECTION AS NEEDED
Status: DISCONTINUED | OUTPATIENT
Start: 2020-04-29 | End: 2020-05-01 | Stop reason: HOSPADM

## 2020-04-29 RX ORDER — FAMOTIDINE 20 MG/1
20 TABLET, FILM COATED ORAL EVERY 12 HOURS
Status: DISCONTINUED | OUTPATIENT
Start: 2020-04-29 | End: 2020-05-01 | Stop reason: HOSPADM

## 2020-04-29 RX ORDER — IBUPROFEN 800 MG/1
800 TABLET ORAL
Status: DISCONTINUED | OUTPATIENT
Start: 2020-04-29 | End: 2020-05-01 | Stop reason: HOSPADM

## 2020-04-29 RX ORDER — OXYTOCIN/RINGER'S LACTATE 15/250 ML
250 PLASTIC BAG, INJECTION (ML) INTRAVENOUS ONCE
Status: DISCONTINUED | OUTPATIENT
Start: 2020-04-29 | End: 2020-04-29

## 2020-04-29 RX ORDER — OXYCODONE AND ACETAMINOPHEN 7.5; 325 MG/1; MG/1
1 TABLET ORAL
Status: DISCONTINUED | OUTPATIENT
Start: 2020-04-29 | End: 2020-05-01 | Stop reason: HOSPADM

## 2020-04-29 RX ORDER — DEXTROSE, SODIUM CHLORIDE, SODIUM LACTATE, POTASSIUM CHLORIDE, AND CALCIUM CHLORIDE 5; .6; .31; .03; .02 G/100ML; G/100ML; G/100ML; G/100ML; G/100ML
125 INJECTION, SOLUTION INTRAVENOUS CONTINUOUS
Status: DISCONTINUED | OUTPATIENT
Start: 2020-04-29 | End: 2020-05-01 | Stop reason: HOSPADM

## 2020-04-29 RX ORDER — PRENATAL VIT 96/IRON FUM/FOLIC 27MG-0.8MG
1 TABLET ORAL DAILY
Status: DISCONTINUED | OUTPATIENT
Start: 2020-04-29 | End: 2020-05-01 | Stop reason: HOSPADM

## 2020-04-29 RX ORDER — NALOXONE HYDROCHLORIDE 0.4 MG/ML
0.4 INJECTION, SOLUTION INTRAMUSCULAR; INTRAVENOUS; SUBCUTANEOUS AS NEEDED
Status: DISCONTINUED | OUTPATIENT
Start: 2020-04-29 | End: 2020-05-01 | Stop reason: HOSPADM

## 2020-04-29 RX ORDER — ONDANSETRON 4 MG/1
4 TABLET, ORALLY DISINTEGRATING ORAL
Status: DISCONTINUED | OUTPATIENT
Start: 2020-04-29 | End: 2020-05-01 | Stop reason: HOSPADM

## 2020-04-29 RX ORDER — DIPHENHYDRAMINE HCL 25 MG
25 CAPSULE ORAL
Status: DISCONTINUED | OUTPATIENT
Start: 2020-04-29 | End: 2020-05-01 | Stop reason: HOSPADM

## 2020-04-29 RX ORDER — SIMETHICONE 80 MG
80 TABLET,CHEWABLE ORAL
Status: DISCONTINUED | OUTPATIENT
Start: 2020-04-29 | End: 2020-05-01 | Stop reason: HOSPADM

## 2020-04-29 RX ORDER — SODIUM CHLORIDE 0.9 % (FLUSH) 0.9 %
5-40 SYRINGE (ML) INJECTION EVERY 8 HOURS
Status: DISCONTINUED | OUTPATIENT
Start: 2020-04-29 | End: 2020-05-01 | Stop reason: HOSPADM

## 2020-04-29 RX ORDER — POLYETHYLENE GLYCOL 3350 17 G/17G
17 POWDER, FOR SOLUTION ORAL DAILY
Status: DISCONTINUED | OUTPATIENT
Start: 2020-04-29 | End: 2020-05-01 | Stop reason: HOSPADM

## 2020-04-29 RX ADMIN — OXYCODONE HYDROCHLORIDE AND ACETAMINOPHEN 1 TABLET: 7.5; 325 TABLET ORAL at 21:54

## 2020-04-29 RX ADMIN — OXYCODONE HYDROCHLORIDE AND ACETAMINOPHEN 1 TABLET: 7.5; 325 TABLET ORAL at 14:25

## 2020-04-29 RX ADMIN — IBUPROFEN 800 MG: 800 TABLET, FILM COATED ORAL at 12:45

## 2020-04-29 RX ADMIN — SIMETHICONE CHEW TAB 80 MG 80 MG: 80 TABLET ORAL at 18:15

## 2020-04-29 RX ADMIN — SIMETHICONE CHEW TAB 80 MG 80 MG: 80 TABLET ORAL at 12:40

## 2020-04-29 RX ADMIN — FAMOTIDINE 20 MG: 20 TABLET, FILM COATED ORAL at 09:32

## 2020-04-29 RX ADMIN — PRENATAL VIT W/ FE FUMARATE-FA TAB 27-0.8 MG 1 TABLET: 27-0.8 TAB at 09:31

## 2020-04-29 RX ADMIN — ACETAMINOPHEN 650 MG: 325 TABLET, FILM COATED ORAL at 09:30

## 2020-04-29 RX ADMIN — ACETAMINOPHEN 1000 MG: 500 TABLET, FILM COATED ORAL at 15:27

## 2020-04-29 RX ADMIN — DOCUSATE SODIUM 100 MG: 100 CAPSULE, LIQUID FILLED ORAL at 18:10

## 2020-04-29 RX ADMIN — ACETAMINOPHEN 500 MG: 500 TABLET, FILM COATED ORAL at 21:54

## 2020-04-29 RX ADMIN — KETOROLAC TROMETHAMINE 30 MG: 30 INJECTION, SOLUTION INTRAMUSCULAR at 06:05

## 2020-04-29 RX ADMIN — FAMOTIDINE 20 MG: 20 TABLET, FILM COATED ORAL at 21:54

## 2020-04-29 RX ADMIN — DOCUSATE SODIUM 100 MG: 100 CAPSULE, LIQUID FILLED ORAL at 09:30

## 2020-04-29 RX ADMIN — ACETAMINOPHEN 650 MG: 325 TABLET, FILM COATED ORAL at 02:50

## 2020-04-29 RX ADMIN — POLYETHYLENE GLYCOL (3350) 17 G: 17 POWDER, FOR SOLUTION ORAL at 09:31

## 2020-04-29 RX ADMIN — SIMETHICONE CHEW TAB 80 MG 80 MG: 80 TABLET ORAL at 21:55

## 2020-04-29 RX ADMIN — IBUPROFEN 800 MG: 800 TABLET, FILM COATED ORAL at 18:10

## 2020-04-29 NOTE — PROGRESS NOTES
Dr. Shabana Dumont at the [facility] after seeing patient. RN reviews patient wish to avoid narcotic pain medication if possible. Prefers alternating Motrin and Tylenol for pain. Current Tylenol order was complete for Anesthesia. Orders received for  Tylenol 1000 mg every 6 hours as needed for mild pain.

## 2020-04-29 NOTE — PROGRESS NOTES
Postpartum packet provided, opportunity for questions provided. CM will continue to follow.  provided education and pamphlet on [facility] Postpartum [facility] Home Visit Program.  Family was undecided on need for home visit. No referral will be made at this time.   Family has this 's contact information should they decide to participate in program.

## 2020-04-29 NOTE — L&D DELIVERY NOTE
Delivery Summary    Patient: Danita Lange MRN: [id number]  SSN: [id number]    YOB: 1985  Age: 30 y.o. Sex: female        Information for the patient's :  Evonne Martinez [[id number]]       Labor Events:    Labor: No    Steroids: None   Cervical Ripening Date/Time:       Cervical Ripening Type: None   Antibiotics During Labor: No   Rupture Identifier: Sac 1    Rupture Date/Time: 2020     Rupture Type: AROM   Amniotic Fluid Volume:  Moderate    Amniotic Fluid Description: Clear    Amniotic Fluid Odor: None    Induction: None       Induction Date/Time:        Indications for Induction:      Augmentation: None   Augmentation Date/Time:      Indications for Augmentation:     Labor complications: None       Additional complications:        Delivery Events:  Indications For Episiotomy:     Episiotomy: None   Perineal Laceration(s): None   Repaired:     Periurethral Laceration Location:      Repaired:     Labial Laceration Location:     Repaired:     Sulcal Laceration Location:     Repaired:     Vaginal Laceration Location:     Repaired:     Cervical Laceration Location:     Repaired:     Repair Suture: None   Number of Repair Packets:     Estimated Blood Loss (ml):  ml   Quantitaive Blood Loss (ml):             Delivery Date: 2020    Delivery Time: 10:28 AM   Delivery Type: , Low Transverse     Details    Trial of Labor: No   Primary/Repeat: Primary   Priority: Routine   Indications:  Multiple Gestation       Sex:  Male     Gestational Age: 43w4d  Delivery Clinician:  Ksenia Staley  Living Status: Living   Delivery Location: OR OR 2          APGARS  One minute Five minutes Ten minutes   Skin color: 1   1        Heart rate: 2   2        Grimace: 2   2        Muscle tone: 2   2        Breathin   2        Totals: 9   9          Presentation: Breech    Position:        Resuscitation Method:  None;Tactile Stimulation;Suctioning-bulb     Meconium Stained: None      Cord Information: 3 Vessels  Complications: None  Cord around:    Delayed cord clamping? Cord clamped date/time:2020 10:28 AM  Disposition of Cord Blood: Lab    Blood Gases Sent?: Yes    Placenta:  Date/Time: 2020 10:36 AM  Removal: Expressed      Appearance: Normal;Intact      Measurements:  Birth Weight: 6 lb 7.4 oz (2.93 kg)      Birth Length: 48 cm      Head Circumference: 33.5 cm      Chest Circumference: 30.5 cm     Abdominal Girth: Other Providers:   Loralie Landau P;PIERO GIBSON;JIGNESH ALCARAZ;ABDULAZIZ FRANZ;MALA OBRIEN;RENATO MAN;WILL WIGGINS;KEVIN FLYNN;AMOR MENCHACA, Obstetrician;Primary Nurse;Primary  Nurse;Neonatologist;Anesthesiologist;Crna;Scrub Tech;Scrub Tech;Respiratory Therapist         Information for the patient's :  Licha Chaudhari [[id number]]       Labor Events:    Labor: No    Steroids: None   Cervical Ripening Date/Time:       Cervical Ripening Type: None   Antibiotics During Labor: No   Rupture Identifier: Sac 2    Rupture Date/Time: 2020 10:29 AM   Rupture Type: AROM   Amniotic Fluid Volume:  Moderate    Amniotic Fluid Description: Clear    Amniotic Fluid Odor: None    Induction: None       Induction Date/Time:        Indications for Induction:      Augmentation: None   Augmentation Date/Time:      Indications for Augmentation:     Labor complications: None       Additional complications:        Delivery Events:  Indications For Episiotomy:     Episiotomy: None   Perineal Laceration(s): None   Repaired:     Periurethral Laceration Location:      Repaired:     Labial Laceration Location:     Repaired:     Sulcal Laceration Location:     Repaired:     Vaginal Laceration Location:     Repaired:     Cervical Laceration Location:     Repaired:     Repair Suture: None   Number of Repair Packets:     Estimated Blood Loss (ml):  ml Quantitaive Blood Loss (ml):             Delivery Date: 2020    Delivery Time: 10:31 AM   Delivery Type: , Low Transverse     Details    Trial of Labor: No   Primary/Repeat: Primary   Priority: Routine   Indications:  Multiple Gestation       Sex:  Male     Gestational Age: 43w4d  Delivery Clinician:  Corina Jiménez  Living Status: Living   Delivery Location: OR OR 2          APGARS  One minute Five minutes Ten minutes   Skin color: 1   1        Heart rate: 2   2        Grimace: 2   2        Muscle tone: 2   2        Breathin   2        Totals: 9   9          Presentation: Vertex    Position:        Resuscitation Method:  Suctioning-bulb; Tactile Stimulation     Meconium Stained: None      Cord Information: 3 Vessels  Complications: None  Cord around:    Delayed cord clamping? Cord clamped date/time:2020 10:31 AM  Disposition of Cord Blood: Lab    Blood Gases Sent?: Yes    Placenta:  Date/Time: 2020 10:36 AM  Removal: Expressed      Appearance: Normal;Intact      Measurements:  Birth Weight: 6 lb 14.8 oz (3.14 kg)      Birth Length: 52 cm      Head Circumference: 35 cm      Chest Circumference: 33 cm     Abdominal Girth:       Other Providers:   Alfred WORTHY;PIERO GIBSON;JIGNESH ALCARAZ;ABDULAZIZ FRANZ;RENATO MAN;MALA OBRIEN;WILL WIGGINS;KEVIN FLYNN;AMOR MENCHACA, Obstetrician;Primary Nurse;Primary [place] Nurse;Neonatologist;Anesthesiologist;Crna;Scrub Tech;Scrub Tech;Respiratory Therapist             Group B Strep: No results found for: Roselyn Bhakta  Information for the patient's :  Tash Breach [[id number]]     Lab Results   Component Value Date/Time    ABO/Rh(D) A POSITIVE 2020 10:28 AM    SUMI IgG NEG 2020 10:28 AM     Information for the patient's :  Tash Breach [[id number]]     Lab Results   Component Value Date/Time    ABO/Rh(D) A POSITIVE 2020 10:31 AM    SUMI IgG NEG 04/28/2020 10:31 AM     Recent Labs     04/28/20  1103 04/28/20  1100   HCO3I 21.9* 21.5*   SO2I 30* 14*   IBD 5 7   SPECTI VENOUS CORD ARTERIAL CORD   ISITE CORD CORD   IDEV OTHER OTHER   IALLEN NOT APPLICABLE NOT APPLICABLE

## 2020-04-29 NOTE — PROGRESS NOTES
Progress Note                               Patient: Toni Nicole MRN: [id number]  SSN: [id number]    YOB: 1985  Age: 30 y.o. Sex: female      1 Day Post-Op     Subjective:     Patient doing well postpartum without significant complaints. Voiding without difficulty. Patient reports normal lochia. . Breastfeeding: Yes     Objective:     Patient Vitals for the past 18 hrs:   Temp Pulse Resp BP SpO2   20 0710 98.2 °F (36.8 °C) 64 16 93/48 94 %   20 2310 98.5 °F (36.9 °C) 60 18 104/63 98 %   20 2000 98.1 °F (36.7 °C) 61 17 101/58 99 %       Temp (24hrs), Av.9 °F (36.6 °C), Min:96.5 °F (35.8 °C), Max:98.6 °F (37 °C)      Physical Exam:    General:   Patient without distress. Abdomen: Soft, fundus firm at level of umbilicus, nontender   Incision: Clean, dry, and intact without erythema   Lower Extremities: Negative for swelling, cords, or tenderness        Lab/Data Review: All lab results for the last 24 hours reviewed. GBS: No results found for: GRBSEXT, GRBSEXT  Blood Type:   Lab Results   Component Value Date/Time    ABO/Rh(D) A POSITIVE 2020 07:34 AM        Assessment and Plan:     Patient appears to be having an uncomplicated post- course. Continue routine postoperative care and maternal education.

## 2020-04-29 NOTE — LACTATION NOTE
This note was copied from a baby's chart. In to see mom and twins for follow up and assistance w/ breast feeding. Baby A has been latching fairly well overall and baby B has not yet feed consistently at the breast. Tried baby B at breast first for 10 minutes, would not suck on lactation finger during finger exercise or make effort to open and suck at mom's breast. Wrapped baby back up and tried baby A on mom's harder side first- R nipple is short and oblong in shape. No success. Tried baby B on mom's L breast and after a few minutes of off and on, he stayed on consistently well and gave nutritive tugging for 40 minutes with stimulation provided(30 minutes observed). Audible swallows heard intermittently throughout. No c/o pain. Mom pumped after and got 2.1mls of colostrum. Showed dad how to draw up milk and give back to baby B in syringe w/ or w/out finger techinque. Dad return demo'ed well. Gave back 1.1ml to baby and saved other 1ml to officially be ahead for next feed, to decrease process time. Infant burped, then swaddled back to sleep. Feeding plan discussed w/ both parents. Pick one baby up per feed and pump for other baby. Divide milk between babies, if baby at breast does not do well. No medical reason at this time baby has to have additional supplementation, however should be closely monitored for input/output, etc. Parents aware can add formula after sessions if desired per choice. Mom and RN stated during mom's pumping sessions left breast has been getting several mls each time and right breast, only drops. Showed mom how to hold pump flange to be able to massage breast during session at same time. R breast started to relieve more drops from breast. Also encouraged if holding babies, to try to hold skin to skin on that side or use warmth w/ pumping sessions to see if increases.  Will follow up in am.

## 2020-04-29 NOTE — PROGRESS NOTES
Post-Op Progress Note    Visit Vitals  /63 (BP 1 Location: Right arm, BP Patient Position: At rest)   Pulse 60   Temp 36.9 °C (98.5 °F)   Resp 18   SpO2 98%   Breastfeeding Yes       Patient is POD 1 from  and received neuraxial duramorph. She had minimal complaints this morning. Her vitals have remained stable and she is tolerating PO. She reports that her lower extremities have returned to baseline neurologically.  Overall, she is doing very well.    -Cont current medication regimen    Lindsey Kohli MD

## 2020-04-29 NOTE — PROGRESS NOTES
La catheter removed per protocol. Pt up to bathroom, educated on harsha care and demonstrated. Pt back to bed resting quietly. Denies needs. Instructed to call for needs or concerns.

## 2020-04-29 NOTE — PROGRESS NOTES
Shift assessment complete as noted. Patient with distended abdomen. Notes she feels \"bloated\". Has not passed gas as of yet. BS active. RN encourages patient to ambulate as much as possible today to help with distention . Questions encouraged and answered. Encouraged to call for needs or concerns. Verbalizes understanding.

## 2020-04-30 PROBLEM — O30.043 DICHORIONIC DIAMNIOTIC TWIN PREGNANCY IN THIRD TRIMESTER: Status: ACTIVE | Noted: 2020-04-30

## 2020-04-30 PROCEDURE — 74011250637 HC RX REV CODE- 250/637: Performed by: OBSTETRICS & GYNECOLOGY

## 2020-04-30 PROCEDURE — 65270000029 HC RM PRIVATE

## 2020-04-30 RX ADMIN — DOCUSATE SODIUM 100 MG: 100 CAPSULE, LIQUID FILLED ORAL at 09:18

## 2020-04-30 RX ADMIN — IBUPROFEN 800 MG: 800 TABLET, FILM COATED ORAL at 12:10

## 2020-04-30 RX ADMIN — SIMETHICONE CHEW TAB 80 MG 80 MG: 80 TABLET ORAL at 21:21

## 2020-04-30 RX ADMIN — SIMETHICONE CHEW TAB 80 MG 80 MG: 80 TABLET ORAL at 07:30

## 2020-04-30 RX ADMIN — OXYCODONE HYDROCHLORIDE AND ACETAMINOPHEN 1 TABLET: 7.5; 325 TABLET ORAL at 21:29

## 2020-04-30 RX ADMIN — DOCUSATE SODIUM 100 MG: 100 CAPSULE, LIQUID FILLED ORAL at 17:38

## 2020-04-30 RX ADMIN — IBUPROFEN 800 MG: 800 TABLET, FILM COATED ORAL at 00:06

## 2020-04-30 RX ADMIN — OXYCODONE HYDROCHLORIDE AND ACETAMINOPHEN 1 TABLET: 7.5; 325 TABLET ORAL at 01:34

## 2020-04-30 RX ADMIN — OXYCODONE HYDROCHLORIDE AND ACETAMINOPHEN 1 TABLET: 7.5; 325 TABLET ORAL at 15:52

## 2020-04-30 RX ADMIN — FAMOTIDINE 20 MG: 20 TABLET, FILM COATED ORAL at 21:20

## 2020-04-30 RX ADMIN — OXYCODONE HYDROCHLORIDE AND ACETAMINOPHEN 1 TABLET: 7.5; 325 TABLET ORAL at 09:18

## 2020-04-30 RX ADMIN — PRENATAL VIT W/ FE FUMARATE-FA TAB 27-0.8 MG 1 TABLET: 27-0.8 TAB at 09:18

## 2020-04-30 RX ADMIN — FAMOTIDINE 20 MG: 20 TABLET, FILM COATED ORAL at 09:18

## 2020-04-30 RX ADMIN — POLYETHYLENE GLYCOL (3350) 17 G: 17 POWDER, FOR SOLUTION ORAL at 09:18

## 2020-04-30 RX ADMIN — IBUPROFEN 800 MG: 800 TABLET, FILM COATED ORAL at 17:38

## 2020-04-30 RX ADMIN — ACETAMINOPHEN 1000 MG: 500 TABLET, FILM COATED ORAL at 17:38

## 2020-04-30 RX ADMIN — SIMETHICONE CHEW TAB 80 MG 80 MG: 80 TABLET ORAL at 16:00

## 2020-04-30 RX ADMIN — SIMETHICONE CHEW TAB 80 MG 80 MG: 80 TABLET ORAL at 11:30

## 2020-04-30 RX ADMIN — IBUPROFEN 800 MG: 800 TABLET, FILM COATED ORAL at 06:33

## 2020-04-30 RX ADMIN — OXYCODONE HYDROCHLORIDE AND ACETAMINOPHEN 1 TABLET: 7.5; 325 TABLET ORAL at 05:18

## 2020-04-30 NOTE — PROGRESS NOTES
04/30/20 0633   Pain Assessment   Pain Scale 1 Numeric (0 - 10)   Pain Intensity 1 6   Pain Location 1 Abdomen; Incisional   Pain Description 1 Aching; Sore   Pain Intervention(s) 1 Medication (see MAR)     PRN Motrin 800mg for pain. Patient does not want anything stronger at this time.

## 2020-04-30 NOTE — LACTATION NOTE
Mom currently following an every other pattern with babies. Just tried with Baby A at breast.  He was sleepy and did about 5 minutes. Dad gave Baby B 4 ml of previously pumped colostrum and is going to follow with curved tip syringe of formula. Mom to pump and will also supplement baby A due to both babies at 9% weight loss. Encouraged parents to follow plan today. Working well together. Will call out as needed. Demoed use of mom's Spectra s9 pump. Plans to rent at discharge.

## 2020-04-30 NOTE — OP NOTES
Section Delivery Operative Report       Patient: Parvin Marie MRN: [id number]  SSN: [id number]    YOB: 1985  Age: 30 y.o. Sex: female       Date of Procedure: 2020     Preoperative Diagnosis: camilla 2020 Primary  Section TWINS BREECH- cerclage removal    Postoperative Diagnosis: Same with Viable male twins apgars 9&9, 9&9    Procedure: Low Cervical Transverse Procedure(s):   SECTION. 2. Removal of cerclage    Surgeon(s):  Jorge Drew MD    Anesthesia: Spinal    Prophylactic Antibiotics: Ancef    Estimated Blood Loss:      [place] [[id number]]         [place] [[id number]]      ml     Information for the patient's :  Melissa Aguilar [[id number]]   Delivery of a 6 lb 7.4 oz (2.93 kg) male infant on 2020 at 10:28 AM. Apgars were 9  and 9 . Umbilical Cord: 3 Vessels     Umbilical Cord Events: None     Placenta: Expressed removal with Normal;Intact appearance. Amniotic Fluid Volume: Moderate     Amniotic Fluid Description:  Clear    Information for the patient's :  MelissaPokelabo [[id number]]   Delivery of a 6 lb 14.8 oz (3.14 kg) male infant on 2020 at 10:31 AM. Apgars were 9  and 9 . Umbilical Cord: 3 Vessels     Umbilical Cord Events: None     Placenta: Expressed removal with Normal;Intact appearance. Amniotic Fluid Volume: Moderate     Amniotic Fluid Description:  Clear         Specimens:   ID Type Source Tests Collected by Time Destination   1 : placenta Placenta Placenta  Jorge Drew MD 2020 1043 Discarded   2 : placenta Placenta Placenta  Jorge Drew MD 2020 1043 Discarded               Complications:  none    Procedure Details: The patient was taken to the operating room, where Spinal anesthesia was administered and found to be adequate. Urinary catheter had been placed using sterile technique.  Patient was pin frog-leg position. Speculum was placed in vagina identifying cervix. The cerclage knot was seen and grasped with a Patience clamp. The knot was retracted identifying a loop of cerlcage mercelene suture which was then cut. The cerclage was removed intact. I went to scrub, and The patient was prepped and draped in the normal sterile fashion. Time out was performed confirming the patient, procedure and any pertinent concerns. The abdomen was entered using the Pfannenstiel technique. The peritoneum was entered sharply well superior to the bladder. The bladder blade was then inserted. The vesicouterine and peritoneum was identified and entered sharply with Metzenbaum scissors. The bladder flap was then created sharply and the bladder blade was reinserted. A low transverse uterine incision was made with the scalpel and extended laterally with blunt finger dissection. Amniotomy was performed on baby A.  Baby delivered in typical breech extraction with fundal pressure and gentle traction. The babys head was then delivered atraumatically. The nose and mouth were suctioned, and cord doubly clamped & severed. . Baby placed in the warmer. Amniotomy on the second sac was performed. The babys head was then delivered atraumatically. The nose and mouth were suctioned. The remainder of the delivery was carried out in the usual fashion without difficulty. The cord was clamped and cut and the baby was handed off to the waiting  care unit staff. The remainder of the delivery was carried out in the usual fashion without difficulty. The cord was clamped and cut and the baby was handed off to the waiting  care unit staff. Placenta was then expressed from the uterus. The uterus was exteriorized and cleared of all clots and debris. The uterine incision was closed with number 1 Chromic in running locking fashion with good hemostasis assured. The posterior cul-de-sac was irrigated with warm normal saline.  Good hemostasis was again reassured and the uterus was returned to the abdomen. The anterior pelvis was irrigated with warm normal saline and good hemostasis was again reassured throughout. Peritoneum closed with running chromic. The rectus muscles were reapproximated in the midline with a series of simple stitches with 0 chromic. The fascia was closed with 0 PDS in a running fashion. Good hemostasis was assured. The subcuticular layers were reapproximated with 2-0 plain gut in running fashion. The skin was closed with a 4-0 Monocryl in a subcuticular fashion. Skin adhesive was applied. The patient tolerated the procedure well. Sponge, lap, and needle counts were correct times three and the patient and baby were taken to recovery/postpartum room in stable condition.     Signed By: Aparna Ambrocio MD     April 29, 2020

## 2020-04-30 NOTE — PROGRESS NOTES
Progress Note                               Patient: Alexandra Bethea MRN: [id number]  SSN: [id number]    YOB: 1985  Age: 30 y.o. Sex: female      2 Days Post-Op     Subjective:     Patient doing well postpartum without significant complaints. Voiding without difficulty. Patient reports normal lochia. .     Objective:     Patient Vitals for the past 18 hrs:   Temp Pulse Resp BP SpO2   20 0715 97.3 °F (36.3 °C) 92 16 121/71 96 %       Temp (24hrs), Av.5 °F (36.4 °C), Min:97.3 °F (36.3 °C), Max:97.7 °F (36.5 °C)      Physical Exam:    General:   Patient without distress. Abdomen: Soft, fundus firm at level of umbilicus, nontender   Incision: Clean, dry, and intact without erythema   Lower Extremities: Negative for swelling, cords, or tenderness        Lab/Data Review:  CBC:    Recent Labs     20  0434 20  0734   WBC  --  8.3   HGB 10.8* 14.9   HCT 31.7* 44.0   PLT  --  131*     Blood Type:   Lab Results   Component Value Date/Time    ABO/Rh(D) A POSITIVE 2020 07:34 AM      Infant's Blood Type: Information for the patient's :  Julieta Burk [[id number]]     Lab Results   Component Value Date/Time    ABO/Rh(D) A POSITIVE 2020 10:28 AM     Information for the patient's :  Julieta Burk [[id number]]     Lab Results   Component Value Date/Time    ABO/Rh(D) A POSITIVE 2020 10:31 AM       Assessment and Plan:     Patient appears to be having an uncomplicated post- course. Continue routine postoperative care and maternal education.

## 2020-05-01 VITALS
BODY MASS INDEX: 27.06 KG/M2 | SYSTOLIC BLOOD PRESSURE: 110 MMHG | TEMPERATURE: 97.8 F | WEIGHT: 194 LBS | RESPIRATION RATE: 16 BRPM | DIASTOLIC BLOOD PRESSURE: 66 MMHG | OXYGEN SATURATION: 98 % | HEART RATE: 73 BPM

## 2020-05-01 PROCEDURE — 74011250637 HC RX REV CODE- 250/637: Performed by: OBSTETRICS & GYNECOLOGY

## 2020-05-01 RX ORDER — IBUPROFEN 800 MG/1
800 TABLET ORAL
Qty: 60 TAB | Refills: 0 | Status: SHIPPED | OUTPATIENT
Start: 2020-05-01 | End: 2020-06-09

## 2020-05-01 RX ORDER — OXYCODONE AND ACETAMINOPHEN 7.5; 325 MG/1; MG/1
1 TABLET ORAL
Qty: 16 TAB | Refills: 0 | Status: SHIPPED | OUTPATIENT
Start: 2020-05-01 | End: 2020-05-08

## 2020-05-01 RX ADMIN — IBUPROFEN 800 MG: 800 TABLET, FILM COATED ORAL at 09:16

## 2020-05-01 RX ADMIN — OXYCODONE HYDROCHLORIDE AND ACETAMINOPHEN 1 TABLET: 7.5; 325 TABLET ORAL at 11:57

## 2020-05-01 RX ADMIN — SIMETHICONE CHEW TAB 80 MG 80 MG: 80 TABLET ORAL at 07:30

## 2020-05-01 RX ADMIN — DOCUSATE SODIUM 100 MG: 100 CAPSULE, LIQUID FILLED ORAL at 09:16

## 2020-05-01 RX ADMIN — POLYETHYLENE GLYCOL (3350) 17 G: 17 POWDER, FOR SOLUTION ORAL at 09:17

## 2020-05-01 RX ADMIN — FAMOTIDINE 20 MG: 20 TABLET, FILM COATED ORAL at 09:15

## 2020-05-01 RX ADMIN — PRENATAL VIT W/ FE FUMARATE-FA TAB 27-0.8 MG 1 TABLET: 27-0.8 TAB at 09:16

## 2020-05-01 RX ADMIN — IBUPROFEN 800 MG: 800 TABLET, FILM COATED ORAL at 00:28

## 2020-05-01 NOTE — PROGRESS NOTES
Family seen by Hortencia Troncoso (RN Case Manager).     AMMY Griffiths  [facility]   [phone]

## 2020-05-01 NOTE — DISCHARGE INSTRUCTIONS
Patient Education         Section: What to Expect at Home  Your Recovery    A  section, or , is surgery to deliver your baby through a cut, called an incision, that the doctor makes in your lower belly and uterus. You may have some pain in your lower belly and need pain medicine for 1 to 2 weeks. You can expect some vaginal bleeding for several weeks. You will probably need about 6 weeks to fully recover. It is important to take it easy while the incision is healing. Avoid heavy lifting, strenuous activities, or exercises that strain the belly muscles while you are recovering. Ask a family member or friend for help with housework, cooking, and shopping. This care sheet gives you a general idea about how long it will take for you to recover. But each person recovers at a different pace. Follow the steps below to get better as quickly as possible. How can you care for yourself at home? Activity    · Rest when you feel tired. Getting enough sleep will help you recover.     · Try to walk each day. Start by walking a little more than you did the day before. Bit by bit, increase the amount you walk. Walking boosts blood flow and helps prevent pneumonia, constipation, and blood clots.     · Avoid strenuous activities, such as bicycle riding, jogging, weightlifting, and aerobic exercise, for 6 weeks or until your doctor says it is okay.     · Until your doctor says it is okay, do not lift anything heavier than your baby.     · Do not do sit-ups or other exercises that strain the belly muscles for 6 weeks or until your doctor says it is okay.     · Hold a pillow over your incision when you cough or take deep breaths. This will support your belly and decrease your pain.     · You may shower as usual. Pat the incision dry when you are done.     · You will have some vaginal bleeding. Wear sanitary pads.  Do not douche or use tampons until your doctor says it is okay.     · Ask your doctor when you can drive again.     · You will probably need to take at least 6 weeks off work. It depends on the type of work you do and how you feel.     · Ask your doctor when it is okay for you to have sex. Diet    · You can eat your normal diet. If your stomach is upset, try bland, low-fat foods like plain rice, broiled chicken, toast, and yogurt.     · Drink plenty of fluids (unless your doctor tells you not to).     · You may notice that your bowel movements are not regular right after your surgery. This is common. Try to avoid constipation and straining with bowel movements. You may want to take a fiber supplement every day. If you have not had a bowel movement after a couple of days, ask your doctor about taking a mild laxative.     · If you are breastfeeding, limit alcohol. Alcohol can cause a lack of energy and other health problems for the baby when a breastfeeding woman drinks heavily. It can also get in the way of a mom's ability to feed her baby or to care for the child in other ways. There isn't a lot of research about exactly how much alcohol can harm a baby. Having no alcohol is the safest choice for your baby. If you choose to have a drink now and then, have only one drink, and limit the number of occasions that you have a drink. Wait to breastfeed at least 2 hours after you have a drink to reduce the amount of alcohol the baby may get in the milk. Medicines    · Your doctor will tell you if and when you can restart your medicines. He or she will also give you instructions about taking any new medicines.     · If you take aspirin or some other blood thinner, ask your doctor if and when to start taking it again. Make sure that you understand exactly what your doctor wants you to do.     · Take pain medicines exactly as directed. ? If the doctor gave you a prescription medicine for pain, take it as prescribed.   ? If you are not taking a prescription pain medicine, ask your doctor if you can take an over-the-counter medicine.     · If you think your pain medicine is making you sick to your stomach:  ? Take your medicine after meals (unless your doctor has told you not to). ? Ask your doctor for a different pain medicine.     · If your doctor prescribed antibiotics, take them as directed. Do not stop taking them just because you feel better. You need to take the full course of antibiotics. Incision care    · If you have strips of tape on the incision, leave the tape on for a week or until it falls off.     · Wash the area daily with warm, soapy water, and pat it dry. Don't use hydrogen peroxide or alcohol, which can slow healing. You may cover the area with a gauze bandage if it weeps or rubs against clothing. Change the bandage every day.     · Keep the area clean and dry. Other instructions    · If you breastfeed your baby, you may be more comfortable while you are healing if you place the baby so that he or she is not resting on your belly. Try tucking your baby under your arm, with his or her body along the side you will be feeding on. Support your baby's upper body with your arm. With that hand you can control your baby's head to bring his or her mouth to your breast. This is sometimes called the football hold. Follow-up care is a key part of your treatment and safety. Be sure to make and go to all appointments, and call your doctor if you are having problems. It's also a good idea to know your test results and keep a list of the medicines you take. When should you call for help? Call  911 anytime you think you may need emergency care.  For example, call if:    · You have thoughts of harming yourself, your baby, or another person.     · You passed out (lost consciousness).     · You have chest pain, are short of breath, or cough up blood.     · You have a seizure.    Call your doctor now or seek immediate medical care if:    · You have pain that does not get better after you take pain medicine.     · You have severe vaginal bleeding.     · You are dizzy or lightheaded, or you feel like you may faint.     · You have new or worse pain in your belly or pelvis.     · You have loose stitches, or your incision comes open.     · You have symptoms of infection, such as:  ? Increased pain, swelling, warmth, or redness. ? Red streaks leading from the incision. ? Pus draining from the incision. ? A fever.     · You have symptoms of a blood clot in your leg (called a deep vein thrombosis), such as:  ? Pain in your calf, back of the knee, thigh, or groin. ? Redness and swelling in your leg or groin.     · You have signs of preeclampsia, such as:  ? Sudden swelling of your face, hands, or feet. ? New vision problems (such as dimness, blurring, or seeing spots). ? A severe headache.    Watch closely for changes in your health, and be sure to contact your doctor if:    · You do not get better as expected. Where can you learn more? Go to http://emiliano-tariq.info/  Enter M806 in the search box to learn more about \" Section: What to Expect at Home. \"  Current as of: May 29, 2019Content Version: 12.4  © 5540-1540 [employer]. Care instructions adapted under license by [employer] (which disclaims liability or warranty for this information). If you have questions about a medical condition or this instruction, always ask your healthcare professional. [place] [address] any warranty or liability for your use of this information.

## 2020-05-01 NOTE — LACTATION NOTE
Mom going home with twins today. Baby A is latching, but both babies are down about 10%. Encouraged trying at breast, but will need to continue to pump and offer extra milk for now. Currently following an every other feeding at breast pattern. See progress notes for feeding plan. Paper copy given to mom. Rental pump complete. Mom reports feedings/supplementing going well. Encouraged frequent feeding. Watch output. Call as needed.

## 2020-05-01 NOTE — PROGRESS NOTES
Post-Operative Day Number 3 Progress Note  Alexandra Bethea  [id number]    Patient doing well post-op day 3 from  delivery without significant complaints. Pain controlled on current medication. Voiding without difficulty, normal lochia. Tolerating regular diet    Vitals:    Patient Vitals for the past 8 hrs:   BP Temp Pulse Resp SpO2   20 0740 110/66 97.8 °F (36.6 °C) 73 16 98 %     Temp (24hrs), Av.9 °F (36.6 °C), Min:97.5 °F (36.4 °C), Max:98.3 °F (36.8 °C)      Vital signs stable, afebrile. Exam:  Patient without distress. Abdomen soft, fundus firm at level of umbilicus, nontender. Incision dry and                      clean without erythema. Labs: No results found for this or any previous visit (from the past 24 hour(s)). Assessment and Plan:  Patient appears to be having uncomplicated post- course. Continue routine post-op care and maternal education.

## 2020-05-01 NOTE — DISCHARGE SUMMARY
Obstetrical Discharge Summary     Name: Roseanne Beasley MRN: [id number]  SSN: [id number]    YOB: 1985  Age: 30 y.o. Sex: female      Allergies: Patient has no known allergies. Admit Date: 2020    Discharge Date: 2020     Admitting Physician: Mary Lazaro MD     Attending Physician:  Eros Hameed MD     * Admission Diagnoses: 38 weeks gestation of pregnancy [Z3A.38]; Twin gestation in third trimester [O30.003]    * Discharge Diagnoses:   Information for the patient's :  [facility] [[id number]]   Delivery of a 6 lb 7.4 oz (2.93 kg) male infant via , Low Transverse on 2020 at 10:28 AM  by Mary Lazaro. Apgars were 9  and 9 . Information for the patient's :  [facility] [[id number]]   Delivery of a 6 lb 14.8 oz (3.14 kg) male infant via , Low Transverse on 2020 at 10:31 AM  by Mary Lazaro. Apgars were 9  and 9 . Additional Diagnoses:   Hospital Problems as of 2020 Date Reviewed: 2020          Codes Class Noted - Resolved POA    * (Principal) Dichorionic diamniotic twin pregnancy in third trimester ICD-10-CM: O30.043  ICD-9-CM: 651.03, V91.03  2020 - Present Yes        Diet controlled gestational diabetes mellitus (GDM) in third trimester ICD-10-CM: O24.410  ICD-9-CM: 648.83  2020 - Present Yes    Overview Signed 2020 11:29 AM by Rakan Love RN     SEE Di/Di Overview             Cervical shortening in pregnancy, third trimester ICD-10-CM: O26.873  ICD-9-CM: 649.73  2019 - Present Yes    Overview Addendum 3/3/2020  8:40 AM by Eros Hameed MD     2019 at [facility]: Cerclage placed. ED  12/10/2019 [facility]: CL/appearance still concerning, but stable. · Continue out of work/although may work remotely if possible. 2019 [facility]: Cervical appearance stable.  [facility] risk PTD  2019 [facility]: CL shorter, to level of cerclage, with concern for ripening, shorter than prior. Indocin course x 72hr, see MFM in 1 week. Uterus bicornis affecting pregnancy in third trimester ICD-10-CM: O34.03, Q51.3  ICD-9-CM: 654.03, 752.34  2019 - Present Yes    Overview Addendum 3/3/2020  8:41 AM by Alexandra Cornejo MD     11/15/2019 at 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 Lab Results   Component Value Date/Time    ABO/Rh(D) A POSITIVE 2020 07:34 AM      Immunization History   Administered Date(s) Administered    Influenza Vaccine 2019    Tdap 2012, 2020       * Procedures:   Procedure(s) with comments:   SECTION - camilla 2020 Primary  Section TWINS BREECH- cerclage removal           * Discharge Condition: good    * Hospital Course: Normal hospital course following the delivery. * Disposition: Home    Discharge Medications:   Current Discharge Medication List      START taking these medications    Details   ibuprofen (MOTRIN) 800 mg tablet Take 1 Tab by mouth every eight (8) hours as needed for Pain. Qty: 60 Tab, Refills: 0      oxyCODONE-acetaminophen (PERCOCET 7.5) 7.5-325 mg per tablet Take 1 Tab by mouth every six (6) hours as needed for Pain for up to 7 days. Max Daily Amount: 4 Tabs. Qty: 16 Tab, Refills: 0    Associated Diagnoses: Status post  section         CONTINUE these medications which have NOT CHANGED    Details   AMBULATORY BREAST PUMP Use as directed  Qty: 1 Device, Refills: 0      polyethylene glycol (MIRALAX) 17 gram/dose powder Take 17 g by mouth daily. famotidine (PEPCID) 20 mg tablet Take 1 Tab by mouth every twelve (12) hours. Indications: gastroesophageal reflux disease  Qty: 60 Tab, Refills: 5      cholecalciferol, vitamin D3, (VITAMIN D3) 2,000 unit tab Take  by mouth.      calcium carbonate (TUMS) 200 mg calcium (500 mg) chew Take 1 Tab by mouth daily.  Patient taking as needed      PNV Combo No.47-Iron-FA #1-DHA 27 mg iron-1 mg -300 mg cap Take  by mouth. STOP taking these medications       ferrous sulfate (IRON) 325 mg (65 mg iron) tablet Comments:   Reason for Stopping:         folic acid 087 mcg tablet Comments:   Reason for Stopping:         pyridoxine, vitamin B6, (VITAMIN B-6) 50 mg tablet Comments:   Reason for Stopping:         doxylamine succinate (UNISOM) 25 mg tablet Comments:   Reason for Stopping:               * Follow-up Care/Patient Instructions:   Activity: No sex for 6 weeks, No driving while on analgesics and No heavy lifting for 6 weeks  Diet: Regular Diet  Wound Care: Keep wound clean and dry    Follow-up Information     Follow up With Specialties Details Why Contact Info    Karmen Adorno DO [address]  [phone]

## 2020-05-01 NOTE — LACTATION NOTE
Individualized Feeding Plan for Breastfeeding Twins   Lactation Services [phone]    As much as possible, hold your babies on your chest so babys bare skin is against your bare skin with a blanket covering babys back, especially 30 minutes before it is time for baby to eat. Watch for early feeding cues such as, licking lips, sucking motions, rooting, hands to mouth. Crying is a late feeding cue. Feed your babies at least 8 times in 24 hours, or more if they are showing feeding cues. If baby is sleepy put baby skin to skin and watch for hunger cues. To rouse baby: unwrap, undress, massage hands, feet, & back, change diaper, gently change babys position from lying to sitting. As much as possible if 1 twin eats, wake the other to eat as well.     15-20 minutes on the first breast of active breastfeeding is considered a good feeding. Good, active breastfeeding is when baby is alert, tugging the nipple, their ear may move, and you can hear swallows. Allow baby to finish the first side before changing sides. Sleeping at the breast or only brief, light sucks should not be considered a good, full breastfeed. If both babies are nursing well, nurse them both. Remember it may take awhile for tandem breastfeeding to work well. Alternate which baby is offered which breast at each feeding. Because you are trying to make enough milk for twins, if possible after daytime nursings pump for about 10 minutes. This will stimulate and increase overall supply. Depending on how well each baby is nursing, you may choose to only offer the breast to 1 baby at each feeding. While you are breastfeeding the other baby should be fed by someone else if possible. The baby who does not go to the breast will be fed by curved tip syringe or bottle. At each feeding:  __x__1. Do Suck Practice on finger before each feeding until sucking pattern is smooth. Try using index finger. Nail down towards tongue. __x__2. Hand Express for a few minutes prior to latching to help start milk flow. __x__3. Baby needs to NURSE WELL x 20-25 minutes on 1 breast.  If no sustained latch only attempt at breast for 5-10 minutes. If both babies latch and nurse well, can opt to do insurance pumping. Double pump after daytime feedings for 10 minutes to help milk come in. If 1 baby nurses well and the other either does not latch or does not go to the breast after nursing double pump both breasts for 15 minutes. Use that pumped milk for the next feeding. If neither baby latches on and feeds well, you should:   __x__4. Double pump for 15 minutes with breast massage and compression. Hand express for an additional 2-3 minutes per side. Pump after each feeding attempt or poor feeding, up to 8 times per day. If you are not putting baby to the breast you need to pump 8 times a day. Pump every at least every 3 hours. __x__5. Split the breast milk you obtain between the babies and depending on volume give using a straight syringe, curved syringe or bottle. If baby does NOT have enough wet and dirty diapers per day, is jaundiced/lethargic, or has significant weight loss AND you do NOT pump enough milk for each feeding (per volume listed below), formula supplementation may need to be used. Call lactation department /pediatrician if you have concerns. AVERAGE INTAKES OF COLOSTRUM BY HEALTHY  INFANTS:  Time  Day Intake (ml/feed)  1st 24 hrs  1 2-10 ml  24-48 hrs  2 5-15 ml  48-72 hrs  3 15-30 ml (0.5-1 oz)  72-96 hrs  4 30-45 ml (1-1.5oz)                          5-6      45-60 ml (1.5-2oz)                           7          At least 60 ml per feeding. By day 7, baby A will need 60 ml or 2 oz at each feeding based on 8 feedings per day & babys weight. (1oz = 30ml). Total milk volume needed in 24 hours by Day 7 is 15-17 oz per day based on baby's birthweight of 6-7.     By day 7, baby B will need 60+ ml or 2+ oz at each feeding based on 8 feedings per day & babys weight. (1oz = 30ml). Total milk volume needed in 24 hours by Day 7 is 16-18 oz per day based on baby's birthweight of 6-15. The more often baby eats, the less volume they need per feeding. If baby is eating more often than the minimum of 8 times per day, they may take less per feeding. Comments: Due to weight loss, will need to continue to offer at least the minimum supplement at each feeding based on chart above. If needed, can switch to supplementing by bottle. Continue to pump. Breastmilk counts towards supplement total. Can continue with an every other pattern of trying at breast.     Use feeding plan until follow up with pediatrician. Continue to attempt at the breast for most feeds. Pump every 3 hours if no latch. Give all pumped colostrum/breastmilk at each feeding. OUTPATIENT APPOINTMENT Suggested. Outpatient services are located on the 4th floor at [address] [place] [address]. Check in at the 4th floor registration desk (the same one you used when you came to have your baby).   Call for questions [phone]

## 2020-05-01 NOTE — LACTATION NOTE
This note was copied from a baby's chart. Infant at 10% weight loss, and is currently already breast feeding, pumping, and formula feeding. Encouraged mother to fed infant every 2-3 hours or on demand. And to call out with any assistance. Mother voiced understanding.

## 2020-05-04 ENCOUNTER — PATIENT OUTREACH (OUTPATIENT)
Dept: OTHER | Age: 35
End: 2020-05-04

## 2020-05-04 NOTE — PROGRESS NOTES
Transition Of Care Note    Patient discharged from [facility] admitted on 2020 and discharged on 2020 following  delivery twin boys. Medical History:     Past Medical History:   Diagnosis Date    Diet controlled gestational diabetes mellitus (GDM) in third trimester 2020    Seasonal allergic rhinitis 2018       Care Manager contacted the patient by telephone to perform post hospital discharge assessment. Verified  and zip code with patient as identifiers. Provided introduction to self, and explanation of the Nurse Care Manager role. Patient's primary care provider relationship reviewed with patient and remains current. Red Flags:  Call your doctor now of seek immediate medical care if  · You have severe vaginal bleeding, soaking at least 1 pad/ hour for 2 or more hours;  · You are passing several large clots with heavy bleeding;  · You are dizzy or lightheaded, or you feel faint;  · You have a fever;  · You have new or worsening belly pain;  · You have signs of swelling in your face, hands or feet;  · You have new vision problems, like dimness, blurring or seeing spots;  · You develop a severe headache; You develop pain in the back of your calf, knee or groin;    Medication:   New Medications at Discharge:  Ibuprofen, Norco  Changed Medications at Discharge:  N/A  Discontinued Medications at Discharge:  Prenatal vitamin, Iron;    Current Outpatient Medications   Medication Sig    ibuprofen (MOTRIN) 800 mg tablet Take 1 Tab by mouth every eight (8) hours as needed for Pain.  AMBULATORY BREAST PUMP Use as directed    famotidine (PEPCID) 20 mg tablet Take 1 Tab by mouth every twelve (12) hours. Indications: gastroesophageal reflux disease    cholecalciferol, vitamin D3, (VITAMIN D3) 2,000 unit tab Take  by mouth.  calcium carbonate (TUMS) 200 mg calcium (500 mg) chew Take 1 Tab by mouth daily.  Patient taking as needed    oxyCODONE-acetaminophen (PERCOCET 7. 5) 7.5-325 mg per tablet Take 1 Tab by mouth every six (6) hours as needed for Pain for up to 7 days. Max Daily Amount: 4 Tabs.  polyethylene glycol (MIRALAX) 17 gram/dose powder Take 17 g by mouth daily.  PNV Combo No.47-Iron-FA #1-DHA 27 mg iron-1 mg -300 mg cap Take  by mouth. No current facility-administered medications for this visit. There are no discontinued medications. Performed medication reconciliation with patient, and patient verbalizes understanding of administration of home medications. There were no barriers to obtaining medications identified at this time. Barriers/Support system:  patient    Home health/DME in place per discharge orders    Barriers/Challenges to Care:   []  Decline in memory    []  Language barrier     []  Emotional                  [x]  Limited mobility  []  Lack of motivation     [] Vision, hearing or cognitive impairment []  Knowledge   [] Financial Barriers []  Lack of support  [x]  Pain []  Other []  None    CM Identified  Problems    Acute Pain postop  Goal:  Verbalizes pain well controlled on current medication regimen;   · Reports Ibuprofen alone is controlling post  discomfort;  · Following activity restrictions;   · Reviewed DC instructions;  ? Verbalized understanding of no driving, no heavy lifting;  ? Reports no problems with urinating postop  ? Reviewed S/Sxs of UTI, teach back successful;    Impaired skin integrity  Goal:  Demonstrates no signs of red flags or other postop complications;  · Reports  incision site slightly puffy at middle;  · States she is laying down as she can with babies' needs;  · Reports conserving energy;  · Received nipple barrier from lactation consultant this AM:  · Slight raw place on nipple with breastfeeding; Discharge Instructions :  Reviewed discharge instructions with patient. Patient verbalizes understanding of discharge instructions and follow-up care.      Advance Care Planning: Patient was offered the opportunity to discuss advance care planning:  yes     Does patient have an Advance Directive:  no   If no, did you provide information on Caring Connections? yes     PCP/Specialist follow up:   Future Appointments   Date Time Provider Moises Vuong   5/14/2020 11:30 AM Chela Tomlinson MD [place] [facility]     Reviewed red flags with patient, and patient verbalizes understanding. Patient given an opportunity to ask questions. No other clinical/social/functional needs noted. The patient agrees to contact the PCP office for questions related to their healthcare. The patient expressed thanks, offered no additional questions and ended the call.

## 2020-05-11 ENCOUNTER — PATIENT OUTREACH (OUTPATIENT)
Dept: OTHER | Age: 35
End: 2020-05-11

## 2020-05-11 NOTE — PROGRESS NOTES
Care Manager contacted the patient by telephone in follow up. Verified  and zip code with patient as identifiers. 29 yo female who is 10 days s/p  delivery of healthy twin boys. Assessment of clinical changes and knowledge demonstrated since last call:   Ongoing Plan of Care:     Acute Pain postop  Goal:  Verbalizes pain well controlled on current medication regimen;   · Reports Ibuprofen alone is controlling post  discomfort;  · Has next FU appts already scheduled;  · No new questions from DC instructions;  · Continues to follow activity restrictions postop;     Impaired skin integrity  Goal:  Demonstrates no signs of red flags or other postop complications;  · Reports incision site healing well;  · No oozing or other signs infection;  · Eating well, resting with babies;   · Return to lactation clinic, doing well;  ? Denies any feeding difficulties; Review and discussion of plan of care with patient, who has provided input to plan, verbalized understanding and agrees with current goals. Any recurrence Red Flags or continued symptoms: None    Medication Regimen Change:  No changes  Completed a review of medications with patient, who verbalized understanding of how and when to take medications. Barriers / Adherence with medications:  No difficulty    Upcoming Appointments:    Future Appointments   Date Time Provider Moises Vuong   2020 11:30 AM Chela Tomlinson MD [place] [facility]       Patient asked questions appropriately and denied any additional needs at this time. Patient verbalized understanding of all information discussed. Patient has my name and contact information for any follow up needs or questions. Plan next call:   Ready to grad on next FU  This note will not be viewable in [address]

## 2020-05-27 ENCOUNTER — PATIENT OUTREACH (OUTPATIENT)
Dept: OTHER | Age: 35
End: 2020-05-27

## 2020-05-27 NOTE — PROGRESS NOTES
Care Manager contacted the patient by telephone in follow up. Verified  and zip code with patient as identifiers. Resolving current episode of case management. Patient has met patient stated and/or medical goals. Goals Met with Plan of Care:   Verbalizes pain well controlled on current medication regimen  Demonstrates no signs of red flags or other postop complications; Patient consistenly demonstrates understanding of the medical action plan through execution of plan. Appointments with key providers are scheduled and attended. Plan of care modified and updated to address new challenges and barriers with minimal support from the CM team (proactively uses physicians and community resources). The support system remains current and has been modified as needed. Patient continues to acquire needed resources from the current support system established. Teach back demonstrates that patient understands education for self management of chronic conditions. Patient consistenly communicates understanding of signs,symptoms and complications for all major diagnoses. Patient modifies his/her lifestyle toreduce or avoid risk factors to his/her health. ECM will follow as needed and patient has ECM contact information for future needs. This note will not be viewable in [address]

## 2020-06-09 PROBLEM — O26.873: Status: RESOLVED | Noted: 2019-12-02 | Resolved: 2020-06-09

## 2020-06-09 PROBLEM — O30.043 DICHORIONIC DIAMNIOTIC TWIN PREGNANCY IN THIRD TRIMESTER: Status: RESOLVED | Noted: 2020-04-30 | Resolved: 2020-06-09

## 2020-06-09 PROBLEM — O34.03 UTERUS BICORNIS AFFECTING PREGNANCY IN THIRD TRIMESTER: Status: RESOLVED | Noted: 2019-11-13 | Resolved: 2020-06-09

## 2020-06-09 PROBLEM — Q51.3 UTERUS BICORNIS AFFECTING PREGNANCY IN THIRD TRIMESTER: Status: RESOLVED | Noted: 2019-11-13 | Resolved: 2020-06-09

## 2020-06-09 PROBLEM — O24.410 DIET CONTROLLED GESTATIONAL DIABETES MELLITUS (GDM) IN THIRD TRIMESTER: Status: RESOLVED | Noted: 2020-02-19 | Resolved: 2020-06-09

## 2020-07-17 ENCOUNTER — EMPLOYEE WELLNESS (OUTPATIENT)
Dept: OTHER | Facility: CLINIC | Age: 35
End: 2020-07-17

## 2022-06-20 ENCOUNTER — APPOINTMENT (RX ONLY)
Dept: URBAN - METROPOLITAN AREA CLINIC 23 | Facility: CLINIC | Age: 37
Setting detail: DERMATOLOGY
End: 2022-06-20

## 2022-06-20 DIAGNOSIS — Z71.89 OTHER SPECIFIED COUNSELING: ICD-10-CM

## 2022-06-20 DIAGNOSIS — D22 MELANOCYTIC NEVI: ICD-10-CM

## 2022-06-20 DIAGNOSIS — L57.8 OTHER SKIN CHANGES DUE TO CHRONIC EXPOSURE TO NONIONIZING RADIATION: ICD-10-CM

## 2022-06-20 DIAGNOSIS — B07.0 PLANTAR WART: ICD-10-CM

## 2022-06-20 PROBLEM — D22.72 MELANOCYTIC NEVI OF LEFT LOWER LIMB, INCLUDING HIP: Status: ACTIVE | Noted: 2022-06-20

## 2022-06-20 PROBLEM — D22.5 MELANOCYTIC NEVI OF TRUNK: Status: ACTIVE | Noted: 2022-06-20

## 2022-06-20 PROCEDURE — ? SUNSCREEN RECOMMENDATIONS

## 2022-06-20 PROCEDURE — ? COUNSELING

## 2022-06-20 PROCEDURE — ? PRESCRIPTION

## 2022-06-20 PROCEDURE — 99204 OFFICE O/P NEW MOD 45 MIN: CPT

## 2022-06-20 PROCEDURE — ? PRESCRIPTION MEDICATION MANAGEMENT

## 2022-06-20 RX ORDER — IMIQUIMOD 12.5 MG/.25G
CREAM TOPICAL
Qty: 12 | Refills: 5 | Status: ERX | COMMUNITY
Start: 2022-06-20

## 2022-06-20 RX ADMIN — IMIQUIMOD: 12.5 CREAM TOPICAL at 00:00

## 2022-06-20 ASSESSMENT — LOCATION SIMPLE DESCRIPTION DERM
LOCATION SIMPLE: RIGHT UPPER BACK
LOCATION SIMPLE: LEFT PRETIBIAL REGION
LOCATION SIMPLE: ABDOMEN
LOCATION SIMPLE: RIGHT SHOULDER
LOCATION SIMPLE: LEFT SHOULDER
LOCATION SIMPLE: GROIN
LOCATION SIMPLE: LEFT PLANTAR SURFACE

## 2022-06-20 ASSESSMENT — LOCATION DETAILED DESCRIPTION DERM
LOCATION DETAILED: RIGHT SUPERIOR UPPER BACK
LOCATION DETAILED: LEFT LATERAL PROXIMAL PRETIBIAL REGION
LOCATION DETAILED: PERIUMBILICAL SKIN
LOCATION DETAILED: LEFT INGUINAL CREASE
LOCATION DETAILED: RIGHT POSTERIOR SHOULDER
LOCATION DETAILED: LEFT POSTERIOR SHOULDER
LOCATION DETAILED: EPIGASTRIC SKIN
LOCATION DETAILED: LEFT PLANTAR FOREFOOT OVERLYING 2ND METATARSAL

## 2022-06-20 ASSESSMENT — LOCATION ZONE DERM
LOCATION ZONE: ARM
LOCATION ZONE: FEET
LOCATION ZONE: LEG
LOCATION ZONE: TRUNK

## 2022-06-20 NOTE — PROCEDURE: PRESCRIPTION MEDICATION MANAGEMENT
Plan: Advised the patient to see podiatry. Discussed different treatment options today.
Render In Strict Bullet Format?: No
Detail Level: Simple

## 2022-06-20 NOTE — PROCEDURE: COUNSELING
Detail Level: Detailed
Detail Level: Zone
Topical Retinoids Recommendations: See Saskia for treatment options.
Sunscreen Recommendations: Broad spectrum

## 2022-08-08 ENCOUNTER — APPOINTMENT (RX ONLY)
Dept: URBAN - METROPOLITAN AREA CLINIC 23 | Facility: CLINIC | Age: 37
Setting detail: DERMATOLOGY
End: 2022-08-08

## 2022-08-08 DIAGNOSIS — D22 MELANOCYTIC NEVI: ICD-10-CM

## 2022-08-08 PROBLEM — D22.5 MELANOCYTIC NEVI OF TRUNK: Status: ACTIVE | Noted: 2022-08-08

## 2022-08-08 PROBLEM — D22.72 MELANOCYTIC NEVI OF LEFT LOWER LIMB, INCLUDING HIP: Status: ACTIVE | Noted: 2022-08-08

## 2022-08-08 PROCEDURE — 11301 SHAVE SKIN LESION 0.6-1.0 CM: CPT

## 2022-08-08 PROCEDURE — ? SHAVE REMOVAL

## 2022-08-08 PROCEDURE — 11301 SHAVE SKIN LESION 0.6-1.0 CM: CPT | Mod: 76

## 2022-08-08 ASSESSMENT — LOCATION DETAILED DESCRIPTION DERM
LOCATION DETAILED: LEFT LATERAL PROXIMAL PRETIBIAL REGION
LOCATION DETAILED: RIGHT SUPERIOR UPPER BACK

## 2022-08-08 ASSESSMENT — LOCATION SIMPLE DESCRIPTION DERM
LOCATION SIMPLE: LEFT PRETIBIAL REGION
LOCATION SIMPLE: RIGHT UPPER BACK

## 2022-08-08 ASSESSMENT — LOCATION ZONE DERM
LOCATION ZONE: LEG
LOCATION ZONE: TRUNK

## 2022-08-08 NOTE — PROCEDURE: SHAVE REMOVAL
Medical Necessity Clause: This procedure was medically necessary because the lesion that was treated was: getting caught on jewelry
Bill 21146 For Specimen Handling/Conveyance To Laboratory?: no
Anesthesia Type: 1% lidocaine with 1:100,000 epinephrine and a 1:10 solution of 8.4% sodium bicarbonate
Hemostasis: Drysol
Notification Instructions: Patient will be notified of pathology results. However, patient instructed to call the office if not contacted within 2 weeks.
Medical Necessity Clause: This procedure was medically necessary because the lesion that was treated was: getting irritated with grooming
Size Of Lesion In Cm (Required): 1
Medical Necessity Information: It is in your best interest to select a reason for this procedure from the list below. All of these items fulfill various CMS LCD requirements except the new and changing color options.
Wound Care: Petrolatum
X Size Of Lesion In Cm (Optional): 0
Detail Level: Detailed
Size Of Lesion In Cm (Required): 0.8
Post-Care Instructions: I reviewed with the patient in detail post-care instructions. Patient is to keep the biopsy site dry overnight, and then apply bacitracin twice daily until healed. Patient may apply hydrogen peroxide soaks to remove any crusting.
Biopsy Method: Personna blade
Was A Bandage Applied: Yes
Billing Type: Third-Party Bill
Accession #: pc
Consent: Verbal consent was obtained from the patient. The risks and benefits to therapy were discussed in detail. Specifically, the risks of infection, scarring, bleeding, prolonged wound healing, incomplete removal, allergy to anesthesia, nerve injury and recurrence were addressed. Prior to the procedure, the treatment site was clearly identified and confirmed by the patient. All components of Universal Protocol/PAUSE Rule completed. Kiera JAMISON

## 2022-08-22 ENCOUNTER — OFFICE VISIT (OUTPATIENT)
Dept: OCCUPATIONAL MEDICINE | Age: 37
End: 2022-08-22

## 2022-08-22 VITALS
TEMPERATURE: 97.9 F | HEART RATE: 75 BPM | SYSTOLIC BLOOD PRESSURE: 116 MMHG | BODY MASS INDEX: 22.82 KG/M2 | HEIGHT: 71 IN | RESPIRATION RATE: 17 BRPM | OXYGEN SATURATION: 98 % | DIASTOLIC BLOOD PRESSURE: 69 MMHG | WEIGHT: 163 LBS

## 2022-08-22 DIAGNOSIS — H10.9 CONJUNCTIVITIS OF RIGHT EYE, UNSPECIFIED CONJUNCTIVITIS TYPE: Primary | ICD-10-CM

## 2022-08-22 PROCEDURE — 99213 OFFICE O/P EST LOW 20 MIN: CPT | Performed by: NURSE PRACTITIONER

## 2022-08-22 RX ORDER — CIPROFLOXACIN HYDROCHLORIDE 3.5 MG/ML
1 SOLUTION/ DROPS TOPICAL
Qty: 5 ML | Refills: 0 | Status: SHIPPED | OUTPATIENT
Start: 2022-08-22 | End: 2022-09-01

## 2022-08-22 ASSESSMENT — ENCOUNTER SYMPTOMS
SHORTNESS OF BREATH: 0
SINUS PAIN: 0
RHINORRHEA: 0
SORE THROAT: 0
EYE REDNESS: 1
EYE PAIN: 0
SINUS PRESSURE: 0
COUGH: 0
EYE DISCHARGE: 1
EYE ITCHING: 1
PHOTOPHOBIA: 0

## 2022-08-22 ASSESSMENT — VISUAL ACUITY: OU: 1

## 2022-08-22 ASSESSMENT — PATIENT HEALTH QUESTIONNAIRE - PHQ9
2. FEELING DOWN, DEPRESSED OR HOPELESS: 0
SUM OF ALL RESPONSES TO PHQ QUESTIONS 1-9: 0
1. LITTLE INTEREST OR PLEASURE IN DOING THINGS: 0
SUM OF ALL RESPONSES TO PHQ9 QUESTIONS 1 & 2: 0

## 2022-08-22 NOTE — PROGRESS NOTES
HISTORY OF PRESENT ILLNESS  Bess Hernandez is a 40 y.o. female     Presenting today with eye redness. Started 3 days ago with red eye, itchy, irritated with discharge in the AM, crusting at times. She has twins, recently recovering from the same thing. She had old Polytrim, using since onset of complaints, not helping. Denies any fever, aches, chills, HA, visual changes, photophobia, eye pain, congestion, sore throat, or cough. Review of Systems   Constitutional:  Negative for chills, diaphoresis, fatigue and fever. HENT:  Negative for congestion, postnasal drip, rhinorrhea, sinus pressure, sinus pain and sore throat. Eyes:  Positive for discharge, redness and itching. Negative for photophobia, pain and visual disturbance. Respiratory:  Negative for cough and shortness of breath. Cardiovascular:  Negative for chest pain. Neurological:  Negative for dizziness and headaches.       Past Medical History:   Diagnosis Date    Diet controlled gestational diabetes mellitus (GDM) in third trimester 2/19/2020    Seasonal allergic rhinitis 5/4/2018     Social History     Socioeconomic History    Marital status:      Spouse name: Not on file    Number of children: Not on file    Years of education: Not on file    Highest education level: Not on file   Occupational History    Not on file   Tobacco Use    Smoking status: Never    Smokeless tobacco: Never   Substance and Sexual Activity    Alcohol use: Not Currently    Drug use: No    Sexual activity: Not on file   Other Topics Concern    Not on file   Social History Narrative    Not on file     Social Determinants of Health     Financial Resource Strain: Not on file   Food Insecurity: Not on file   Transportation Needs: Not on file   Physical Activity: Not on file   Stress: Not on file   Social Connections: Not on file   Intimate Partner Violence: Not on file   Housing Stability: Not on file     Family History   Problem Relation Age of Onset    Other Father         paroxysmal afib    Depression Brother         29year old     No Known Problems Brother     No Known Problems Brother     Breast Cancer Neg Hx     Ovarian Cancer Neg Hx     Colon Cancer Neg Hx     Diabetes Neg Hx     Hypertension Neg Hx     Depression Mother      Past Surgical History:   Procedure Laterality Date     SECTION  2020    OTHER SURGICAL HISTORY      intussusception as a baby    WISDOM TOOTH EXTRACTION       Immunization History   Administered Date(s) Administered    COVID-19, PFIZER PURPLE top, DILUTE for use, (age 15 y+), 30mcg/0.3mL 2020, 2021    Influenza Trivalent 2019    Influenza Virus Vaccine 10/12/2020    Tdap (Boostrix, Adacel) 2012, 2020     Current Outpatient Medications   Medication Sig Dispense Refill    ciprofloxacin (CILOXAN) 0.3 % ophthalmic solution Place 1 drop into the right eye every 4 hours (while awake) for 10 days 5 mL 0     No current facility-administered medications for this visit. No Known Allergies    Physical Exam  Vitals reviewed. Constitutional:       General: She is awake. She is not in acute distress. Appearance: Normal appearance. She is well-developed and well-groomed. She is not ill-appearing or toxic-appearing. HENT:      Head: Normocephalic and atraumatic. Eyes:      General: Lids are normal. Lids are everted, no foreign bodies appreciated. Vision grossly intact. Gaze aligned appropriately. Extraocular Movements: Extraocular movements intact. Conjunctiva/sclera:      Right eye: Right conjunctiva is injected. Pupils: Pupils are equal, round, and reactive to light. Skin:     General: Skin is warm and dry. Neurological:      General: No focal deficit present. Mental Status: She is alert and oriented to person, place, and time.    Psychiatric:         Mood and Affect: Mood normal.         Behavior: Behavior normal. Behavior is cooperative. Thought Content: Thought content normal.        /69 (Site: Right Upper Arm, Position: Sitting, Cuff Size: Medium Adult)   Pulse 75   Temp 97.9 °F (36.6 °C) (Temporal)   Resp 17   Ht 5' 11\" (1.803 m)   Wt 163 lb (73.9 kg)   LMP  (LMP Unknown) Comment: IUD placed 2 years ago  SpO2 98%   BMI 22.73 kg/m²   Vision Screening    Right eye Left eye Both eyes   Without correction 20 20 20 20 20 10   With correction          ASSESSMENT and Steve Record was seen today for conjunctivitis. Diagnoses and all orders for this visit:    Conjunctivitis of right eye, unspecified conjunctivitis type  -     ciprofloxacin (CILOXAN) 0.3 % ophthalmic solution; Place 1 drop into the right eye every 4 hours (while awake) for 10 days    I have reviewed this being a viral process based on exam findings, self limiting, as she has had no response to the Polytrim. We discussed coverage with Ciloxan. Risk and benefits of antibx reviewed. Start Ciloxan as ordered above. Hand hygiene reviewed. Reviewed cleaning eye from inner to outer canthus. If eye pain starts or rapid change in visual acuity, she is to follow up with PCP or here ASAP.

## 2023-08-07 ENCOUNTER — OFFICE VISIT (OUTPATIENT)
Dept: OCCUPATIONAL MEDICINE | Age: 38
End: 2023-08-07

## 2023-08-07 VITALS
SYSTOLIC BLOOD PRESSURE: 101 MMHG | HEART RATE: 83 BPM | BODY MASS INDEX: 22.12 KG/M2 | WEIGHT: 158 LBS | OXYGEN SATURATION: 98 % | DIASTOLIC BLOOD PRESSURE: 65 MMHG | HEIGHT: 71 IN | TEMPERATURE: 98.2 F | RESPIRATION RATE: 16 BRPM

## 2023-08-07 DIAGNOSIS — H60.392 OTHER INFECTIVE ACUTE OTITIS EXTERNA OF LEFT EAR: Primary | ICD-10-CM

## 2023-08-07 RX ORDER — CIPROFLOXACIN AND DEXAMETHASONE 3; 1 MG/ML; MG/ML
4 SUSPENSION/ DROPS AURICULAR (OTIC) 2 TIMES DAILY
Qty: 1 EACH | Refills: 0 | Status: SHIPPED | OUTPATIENT
Start: 2023-08-07 | End: 2023-08-14

## 2023-08-07 ASSESSMENT — ENCOUNTER SYMPTOMS
SHORTNESS OF BREATH: 0
EYE ITCHING: 1
VOMITING: 0
FACIAL SWELLING: 0
EYE PAIN: 0
SINUS PRESSURE: 0
BACK PAIN: 0
NAUSEA: 0
CHEST TIGHTNESS: 0
SORE THROAT: 0
DIARRHEA: 0
PHOTOPHOBIA: 0
TROUBLE SWALLOWING: 0
COUGH: 0
ABDOMINAL PAIN: 0
CONSTIPATION: 0
EYE REDNESS: 0
WHEEZING: 0
SINUS PAIN: 0

## 2023-08-07 ASSESSMENT — PATIENT HEALTH QUESTIONNAIRE - PHQ9
SUM OF ALL RESPONSES TO PHQ QUESTIONS 1-9: 0
SUM OF ALL RESPONSES TO PHQ QUESTIONS 1-9: 0
2. FEELING DOWN, DEPRESSED OR HOPELESS: 0
SUM OF ALL RESPONSES TO PHQ9 QUESTIONS 1 & 2: 0
1. LITTLE INTEREST OR PLEASURE IN DOING THINGS: 0
SUM OF ALL RESPONSES TO PHQ QUESTIONS 1-9: 0
SUM OF ALL RESPONSES TO PHQ QUESTIONS 1-9: 0

## 2023-08-07 NOTE — PROGRESS NOTES
PROGRESS NOTE    SUBJECTIVE:   Edilia Finnegan is a 41 y.o. female seen for ____. Chief Complaint    Otalgia         Otalgia   There is pain in the left ear. This is a new problem. The current episode started yesterday. The problem occurs constantly. The problem has been gradually worsening. There has been no fever. The pain is at a severity of 5/10. The pain is moderate. Associated symptoms include hearing loss. Pertinent negatives include no abdominal pain, coughing, diarrhea, ear discharge, headaches, neck pain, sore throat or vomiting. She has tried NSAIDs (ibuprofen) for the symptoms. The treatment provided mild relief. Her past medical history is significant for hearing loss. Current Outpatient Medications   Medication Sig Dispense Refill    ciprofloxacin-dexamethasone (CIPRODEX) 0.3-0.1 % otic suspension Place 4 drops into the left ear 2 times daily for 7 days 1 each 0     No current facility-administered medications for this visit. No Known Allergies    Social History     Tobacco Use    Smoking status: Never    Smokeless tobacco: Never   Substance Use Topics    Alcohol use: Not Currently    Drug use: No        Review of Systems   Constitutional:  Negative for appetite change, chills, fatigue and fever. HENT:  Positive for ear pain and hearing loss. Negative for congestion, ear discharge, facial swelling, mouth sores, nosebleeds, postnasal drip, sinus pressure, sinus pain, sore throat and trouble swallowing. Left ear pain. that started yesterday. Patient states, \"it feels full and stuffy. I can't hear on that side\". Eyes:  Positive for itching. Negative for photophobia, pain and redness. Respiratory:  Negative for cough, chest tightness, shortness of breath and wheezing. Cardiovascular:  Negative for chest pain and palpitations. Gastrointestinal:  Negative for abdominal pain, constipation, diarrhea, nausea and vomiting.    Genitourinary:  Negative for difficulty

## 2023-10-02 RX ORDER — PREDNISONE 20 MG/1
20 TABLET ORAL DAILY
Qty: 7 TABLET | Refills: 0 | Status: SHIPPED | OUTPATIENT
Start: 2023-10-02 | End: 2023-10-09

## 2023-10-02 RX ORDER — AZITHROMYCIN 250 MG/1
250 TABLET, FILM COATED ORAL SEE ADMIN INSTRUCTIONS
Qty: 6 TABLET | Refills: 0 | Status: SHIPPED | OUTPATIENT
Start: 2023-10-02 | End: 2023-10-07

## 2023-10-02 NOTE — PROGRESS NOTES
Pt has had a cold for several weeks. She is coughing up yellow/green phlegm. She now has rhonchi on exam and difficulty taking a deep breath. I will eprescribe abx and steroids for PNA.

## 2024-08-13 LAB
CHOLEST SERPL-MCNC: 196 MG/DL (ref 0–200)
GLUCOSE SERPL-MCNC: 86 MG/DL (ref 70–99)
HDLC SERPL-MCNC: 50 MG/DL (ref 40–60)
LDLC SERPL CALC-MCNC: 131 MG/DL (ref 0–100)
TRIGL SERPL-MCNC: 76 MG/DL (ref 0–150)

## 2024-08-17 SDOH — ECONOMIC STABILITY: FOOD INSECURITY: WITHIN THE PAST 12 MONTHS, YOU WORRIED THAT YOUR FOOD WOULD RUN OUT BEFORE YOU GOT MONEY TO BUY MORE.: NEVER TRUE

## 2024-08-17 SDOH — ECONOMIC STABILITY: TRANSPORTATION INSECURITY
IN THE PAST 12 MONTHS, HAS LACK OF TRANSPORTATION KEPT YOU FROM MEETINGS, WORK, OR FROM GETTING THINGS NEEDED FOR DAILY LIVING?: NO

## 2024-08-17 SDOH — ECONOMIC STABILITY: FOOD INSECURITY: WITHIN THE PAST 12 MONTHS, THE FOOD YOU BOUGHT JUST DIDN'T LAST AND YOU DIDN'T HAVE MONEY TO GET MORE.: NEVER TRUE

## 2024-08-17 SDOH — ECONOMIC STABILITY: INCOME INSECURITY: HOW HARD IS IT FOR YOU TO PAY FOR THE VERY BASICS LIKE FOOD, HOUSING, MEDICAL CARE, AND HEATING?: NOT HARD AT ALL

## 2024-08-19 NOTE — PROGRESS NOTES
Dot Damian is 39 y.o. female,  , who presents today for a routine annual gynecological examination.No LMP recorded (lmp unknown). (Menstrual status: IUD). . Doing well.  No Gyn, Breast, G/U, or GI complaints. Twin Boys doing great.  Almost 4 1/2.    Ready for IUD to be removed.  Her  had a vasectomy.  No problems with it.         No data to display                  2024     9:00 AM   GYN Intake Questionnaire   Current Form of Contraception Vasectomy;IUD   Dyspareunia None   Post-Coital Bleeding None       Contraception: iud placed in  , vasectomy  Has received Gardisil: NO    OB History:   OB History    Para Term  AB Living   1 1 1 0 0 2   SAB IAB Ectopic Molar Multiple Live Births           1 2      # Outcome Date GA Lbr Bill/2nd Weight Sex Type Anes PTL Lv   1A Term 20 38w1d  2.93 kg (6 lb 7.4 oz) M CS-LTranv Spinal N DOUGLAS   1B Term 20 38w1d  3.14 kg (6 lb 14.8 oz) M CS-LTranv Spinal N DOUGLAS         Health Maintenance Due   Topic Date Due    Varicella vaccine (1 of 2 - 13+ 2-dose series) Never done    Hepatitis B vaccine (1 of 3 - 19+ 3-dose series) Never done    COVID-19 Vaccine (3 - - season) 2023    Flu vaccine (1) 2024    Depression Screen  2024         GYN History            Dot  has a past medical history of Diet controlled gestational diabetes mellitus (GDM) in third trimester and Seasonal allergic rhinitis. .    Her surgeries include  has a past surgical history that includes  section (2020); [place] tooth extraction; and other surgical history..    No Known Allergies     Her current meds are:   No current outpatient medications on file.     No current facility-administered medications for this visit.          Family history is significant for family history includes Depression in her brother and mother; No Known Problems in her brother and brother; Other in her father..    Dot  reports that she has

## 2024-08-20 ENCOUNTER — OFFICE VISIT (OUTPATIENT)
Dept: OBGYN CLINIC | Age: 39
End: 2024-08-20
Payer: COMMERCIAL

## 2024-08-20 VITALS
BODY MASS INDEX: 22.26 KG/M2 | WEIGHT: 159 LBS | HEIGHT: 71 IN | SYSTOLIC BLOOD PRESSURE: 110 MMHG | DIASTOLIC BLOOD PRESSURE: 72 MMHG

## 2024-08-20 DIAGNOSIS — Z30.432 ENCOUNTER FOR IUD REMOVAL: ICD-10-CM

## 2024-08-20 DIAGNOSIS — Z12.4 CERVICAL CANCER SCREENING: ICD-10-CM

## 2024-08-20 DIAGNOSIS — Z01.419 WELL WOMAN EXAM WITH ROUTINE GYNECOLOGICAL EXAM: Primary | ICD-10-CM

## 2024-08-20 PROCEDURE — 99385 PREV VISIT NEW AGE 18-39: CPT | Performed by: OBSTETRICS & GYNECOLOGY

## 2024-08-20 PROCEDURE — 99459 PELVIC EXAMINATION: CPT | Performed by: OBSTETRICS & GYNECOLOGY

## 2024-08-20 PROCEDURE — 58301 REMOVE INTRAUTERINE DEVICE: CPT | Performed by: OBSTETRICS & GYNECOLOGY

## 2024-08-20 ASSESSMENT — ENCOUNTER SYMPTOMS
COUGH: 0
ABDOMINAL PAIN: 0
BLOOD IN STOOL: 0
GASTROINTESTINAL NEGATIVE: 1
RESPIRATORY NEGATIVE: 1
ALLERGIC/IMMUNOLOGIC NEGATIVE: 1
SHORTNESS OF BREATH: 0
EYES NEGATIVE: 1

## 2024-08-20 ASSESSMENT — PATIENT HEALTH QUESTIONNAIRE - PHQ9
1. LITTLE INTEREST OR PLEASURE IN DOING THINGS: NOT AT ALL
2. FEELING DOWN, DEPRESSED OR HOPELESS: NOT AT ALL
SUM OF ALL RESPONSES TO PHQ9 QUESTIONS 1 & 2: 0
SUM OF ALL RESPONSES TO PHQ QUESTIONS 1-9: 0

## 2024-08-22 LAB
COLLECTION METHOD: NORMAL
CYTOLOGIST CVX/VAG CYTO: NORMAL
CYTOLOGY CVX/VAG DOC THIN PREP: NORMAL
HPV REFLEX: NORMAL
Lab: NORMAL
OTHER PT INFO: NORMAL
PAP SOURCE: NORMAL
PATH REPORT.FINAL DX SPEC: NORMAL
PREV CYTO INFO: NORMAL
PREV TREATMENT RESULTS: NORMAL
PREV TREATMENT: NORMAL
STAT OF ADQ CVX/VAG CYTO-IMP: NORMAL

## 2024-11-12 NOTE — PROGRESS NOTES
Pt with R eye edema with concern for periorbital cellulitis. Pt to be started on augmentin x 10 days.     PAULINA Song

## 2024-12-05 ENCOUNTER — NURSE ONLY (OUTPATIENT)
Dept: FAMILY MEDICINE CLINIC | Facility: CLINIC | Age: 39
End: 2024-12-05
Payer: COMMERCIAL

## 2024-12-05 ENCOUNTER — TELEPHONE (OUTPATIENT)
Dept: FAMILY MEDICINE CLINIC | Facility: CLINIC | Age: 39
End: 2024-12-05

## 2024-12-05 VITALS
SYSTOLIC BLOOD PRESSURE: 120 MMHG | DIASTOLIC BLOOD PRESSURE: 76 MMHG | HEART RATE: 82 BPM | BODY MASS INDEX: 21.76 KG/M2 | OXYGEN SATURATION: 99 % | WEIGHT: 156 LBS | TEMPERATURE: 98.4 F

## 2024-12-05 DIAGNOSIS — H44.001 INFECTION OF RIGHT EYE: Primary | ICD-10-CM

## 2024-12-05 PROCEDURE — 99213 OFFICE O/P EST LOW 20 MIN: CPT | Performed by: NURSE PRACTITIONER

## 2024-12-05 RX ORDER — DOXYCYCLINE 100 MG/1
100 CAPSULE ORAL 2 TIMES DAILY
Qty: 20 CAPSULE | Refills: 0 | Status: SHIPPED | OUTPATIENT
Start: 2024-12-05 | End: 2024-12-15

## 2024-12-05 NOTE — PROGRESS NOTES
Dot Damian is a 39 y.o. female who presents today for the following:  Chief Complaint   Patient presents with    Stye     Pt presents today with stye in rt eye x 1 wk.        No Known Allergies    Current Outpatient Medications   Medication Sig Dispense Refill    doxycycline hyclate (VIBRAMYCIN) 100 MG capsule Take 1 capsule by mouth 2 times daily for 10 days 20 capsule 0     No current facility-administered medications for this visit.       Past Medical History:   Diagnosis Date    Diet controlled gestational diabetes mellitus (GDM) in third trimester 2020    Seasonal allergic rhinitis 2018       Past Surgical History:   Procedure Laterality Date     SECTION  2020    OTHER SURGICAL HISTORY      intussusception as a baby    WISDOM TOOTH EXTRACTION         Social History     Tobacco Use    Smoking status: Never    Smokeless tobacco: Never   Substance Use Topics    Alcohol use: Not Currently        Family History   Problem Relation Age of Onset    Other Father         paroxysmal afib    Depression Brother         34 year old     No Known Problems Brother     No Known Problems Brother     Breast Cancer Neg Hx     Ovarian Cancer Neg Hx     Colon Cancer Neg Hx     Diabetes Neg Hx     Hypertension Neg Hx     Depression Mother        Patient presents for acute employee visit for \"stye\" right lower inner lid for about a week.  On , she had a style to left upper eye lid that was resolving when she developed another one to right upper eyelid.  She was prescribed Augmentin for right upper eye  for 10 days and left eye resolved as well as right upper eye.  She developed about a week ago right lower inner lid stye, no better and no worse.  Warm compresses tried.  Very mild discomfort.  No visual acuity changes.  Unclear drainage.  Mild right cheek/facial swelling and puffiness.  Similar to episode she developed on her left side that resolved.    Interestingly, her son has been

## 2025-05-22 LAB
CHOLEST SERPL-MCNC: 164 MG/DL (ref 0–200)
GLUCOSE SERPL-MCNC: 89 MG/DL (ref 70–99)
HDLC SERPL-MCNC: 55 MG/DL (ref 40–60)
LDLC SERPL CALC-MCNC: 98 MG/DL (ref 0–100)
TRIGL SERPL-MCNC: 56 MG/DL (ref 0–150)

## (undated) DEVICE — DRAPE TWL SURG 16X26IN BLU ORB04] ALLCARE INC]

## (undated) DEVICE — SUT CHRMC 1 27IN CT1 BRN --

## (undated) DEVICE — SOLUTION IRRIG 1000ML H2O STRL BLT

## (undated) DEVICE — SUT CHRMC 1 36IN CTX BRN --

## (undated) DEVICE — SOLUTION IV 1000ML 0.9% SOD CHL

## (undated) DEVICE — SURGICAL PROCEDURE PACK C SECT CDS

## (undated) DEVICE — PENCIL ES L3M BTTN SWCH S STL HEX LOK BLDE ELECTRD HOLSTER

## (undated) DEVICE — SUTURE PROL 2 L60IN NONABSORBABLE BLU TP 1 L65MM 1 2 CIR 8825G

## (undated) DEVICE — KENDALL SCD EXPRESS SLEEVES, KNEE LENGTH, MEDIUM: Brand: KENDALL SCD

## (undated) DEVICE — X-RAY SPONGES,12 PLY: Brand: DERMACEA

## (undated) DEVICE — CATH FOL TY IC BAG 16FR 2000ML -- CONVERT TO ITEM 363158

## (undated) DEVICE — STERILE POLYISOPRENE POWDER-FREE SURGICAL GLOVES: Brand: PROTEXIS

## (undated) DEVICE — PREMIUM WET SKIN PREP TRAY: Brand: MEDLINE INDUSTRIES, INC.

## (undated) DEVICE — GOWN,REINF,POLY,ECL,PP SLV,XL: Brand: MEDLINE

## (undated) DEVICE — PREP SKN DURAPREP 26ML APPL --

## (undated) DEVICE — MEDI-VAC NON-CONDUCTIVE SUCTION TUBING: Brand: CARDINAL HEALTH

## (undated) DEVICE — SUTURE PLN GUT SZ 2-0 L27IN ABSRB YELLOWISH TAN L40MM CT 853H

## (undated) DEVICE — REM POLYHESIVE ADULT PATIENT RETURN ELECTRODE: Brand: VALLEYLAB

## (undated) DEVICE — APPLICATOR BNDG 1MM ADH PREMIERPRO EXOFIN

## (undated) DEVICE — Z INACTIVE USE 2527070 DRAPE SURG W40XL44IN UNDERBUTTOCK SMS POLYPR W/ PCH BK DISP

## (undated) DEVICE — LITHOTOMY: Brand: MEDLINE INDUSTRIES, INC.

## (undated) DEVICE — SUTURE MCRYL SZ 4-0 L27IN ABSRB UD L19MM PS-2 1/2 CIR PRIM Y426H